# Patient Record
Sex: MALE | Race: BLACK OR AFRICAN AMERICAN | NOT HISPANIC OR LATINO | Employment: OTHER | URBAN - METROPOLITAN AREA
[De-identification: names, ages, dates, MRNs, and addresses within clinical notes are randomized per-mention and may not be internally consistent; named-entity substitution may affect disease eponyms.]

---

## 2018-01-01 ENCOUNTER — HOSPITAL ENCOUNTER (INPATIENT)
Facility: HOSPITAL | Age: 73
LOS: 2 days | Discharge: HOME HEALTH | DRG: 190 | End: 2018-01-03
Attending: EMERGENCY MEDICINE | Admitting: HOSPITALIST
Payer: MEDICARE

## 2018-01-01 DIAGNOSIS — J44.1 COPD WITH ACUTE EXACERBATION: Primary | ICD-10-CM

## 2018-01-01 DIAGNOSIS — R79.89 ELEVATED TROPONIN: ICD-10-CM

## 2018-01-01 DIAGNOSIS — R06.09 DYSPNEA ON EXERTION: ICD-10-CM

## 2018-01-01 DIAGNOSIS — R07.9 CHEST PAIN: ICD-10-CM

## 2018-01-01 PROBLEM — D72.829 LEUKOCYTOSIS: Status: ACTIVE | Noted: 2018-01-01

## 2018-01-01 LAB
ALBUMIN SERPL BCP-MCNC: 3.4 G/DL
ALP SERPL-CCNC: 105 U/L
ALT SERPL W/O P-5'-P-CCNC: 28 U/L
ANION GAP SERPL CALC-SCNC: 11 MMOL/L
AST SERPL-CCNC: 21 U/L
BASOPHILS # BLD AUTO: 0.08 K/UL
BASOPHILS NFR BLD: 0.6 %
BILIRUB SERPL-MCNC: 1.7 MG/DL
BNP SERPL-MCNC: 35 PG/ML
BUN SERPL-MCNC: 26 MG/DL
CALCIUM SERPL-MCNC: 9.7 MG/DL
CHLORIDE SERPL-SCNC: 109 MMOL/L
CO2 SERPL-SCNC: 32 MMOL/L
CREAT SERPL-MCNC: 1 MG/DL
DIFFERENTIAL METHOD: ABNORMAL
EOSINOPHIL # BLD AUTO: 0 K/UL
EOSINOPHIL NFR BLD: 0 %
ERYTHROCYTE [DISTWIDTH] IN BLOOD BY AUTOMATED COUNT: 12.7 %
EST. GFR  (AFRICAN AMERICAN): >60 ML/MIN/1.73 M^2
EST. GFR  (NON AFRICAN AMERICAN): >60 ML/MIN/1.73 M^2
FLUAV AG SPEC QL IA: NEGATIVE
FLUBV AG SPEC QL IA: NEGATIVE
GLUCOSE SERPL-MCNC: 133 MG/DL
HCT VFR BLD AUTO: 43.2 %
HGB BLD-MCNC: 14.2 G/DL
LYMPHOCYTES # BLD AUTO: 1.9 K/UL
LYMPHOCYTES NFR BLD: 13.8 %
MCH RBC QN AUTO: 31.6 PG
MCHC RBC AUTO-ENTMCNC: 32.9 G/DL
MCV RBC AUTO: 96 FL
MONOCYTES # BLD AUTO: 1 K/UL
MONOCYTES NFR BLD: 7 %
NEUTROPHILS # BLD AUTO: 10.9 K/UL
NEUTROPHILS NFR BLD: 78.6 %
PLATELET # BLD AUTO: 408 K/UL
PMV BLD AUTO: 9 FL
POTASSIUM SERPL-SCNC: 3.8 MMOL/L
PROT SERPL-MCNC: 7.4 G/DL
RBC # BLD AUTO: 4.49 M/UL
SODIUM SERPL-SCNC: 152 MMOL/L
SPECIMEN SOURCE: NORMAL
TROPONIN I SERPL DL<=0.01 NG/ML-MCNC: 0.03 NG/ML
TROPONIN I SERPL DL<=0.01 NG/ML-MCNC: 0.04 NG/ML
WBC # BLD AUTO: 13.9 K/UL

## 2018-01-01 PROCEDURE — 87400 INFLUENZA A/B EACH AG IA: CPT | Mod: 59

## 2018-01-01 PROCEDURE — 63600175 PHARM REV CODE 636 W HCPCS: Performed by: EMERGENCY MEDICINE

## 2018-01-01 PROCEDURE — 85025 COMPLETE CBC W/AUTO DIFF WBC: CPT

## 2018-01-01 PROCEDURE — 84484 ASSAY OF TROPONIN QUANT: CPT | Mod: 91

## 2018-01-01 PROCEDURE — 99900035 HC TECH TIME PER 15 MIN (STAT)

## 2018-01-01 PROCEDURE — 93005 ELECTROCARDIOGRAM TRACING: CPT

## 2018-01-01 PROCEDURE — 25000003 PHARM REV CODE 250: Performed by: EMERGENCY MEDICINE

## 2018-01-01 PROCEDURE — 36415 COLL VENOUS BLD VENIPUNCTURE: CPT

## 2018-01-01 PROCEDURE — 96365 THER/PROPH/DIAG IV INF INIT: CPT

## 2018-01-01 PROCEDURE — 94660 CPAP INITIATION&MGMT: CPT

## 2018-01-01 PROCEDURE — 83880 ASSAY OF NATRIURETIC PEPTIDE: CPT

## 2018-01-01 PROCEDURE — 27000221 HC OXYGEN, UP TO 24 HOURS

## 2018-01-01 PROCEDURE — 87040 BLOOD CULTURE FOR BACTERIA: CPT | Mod: 59

## 2018-01-01 PROCEDURE — 93010 ELECTROCARDIOGRAM REPORT: CPT | Mod: 76,,, | Performed by: INTERNAL MEDICINE

## 2018-01-01 PROCEDURE — 25000242 PHARM REV CODE 250 ALT 637 W/ HCPCS: Performed by: EMERGENCY MEDICINE

## 2018-01-01 PROCEDURE — 94640 AIRWAY INHALATION TREATMENT: CPT

## 2018-01-01 PROCEDURE — 63600175 PHARM REV CODE 636 W HCPCS: Performed by: HOSPITALIST

## 2018-01-01 PROCEDURE — 93010 ELECTROCARDIOGRAM REPORT: CPT | Mod: ,,, | Performed by: INTERNAL MEDICINE

## 2018-01-01 PROCEDURE — 27000190 HC CPAP FULL FACE MASK W/VALVE

## 2018-01-01 PROCEDURE — 96367 TX/PROPH/DG ADDL SEQ IV INF: CPT

## 2018-01-01 PROCEDURE — 80053 COMPREHEN METABOLIC PANEL: CPT

## 2018-01-01 PROCEDURE — 99285 EMERGENCY DEPT VISIT HI MDM: CPT | Mod: 25

## 2018-01-01 PROCEDURE — 94644 CONT INHLJ TX 1ST HOUR: CPT

## 2018-01-01 PROCEDURE — 27100107 HC POCKET PEAK FLOW METER

## 2018-01-01 PROCEDURE — 96366 THER/PROPH/DIAG IV INF ADDON: CPT

## 2018-01-01 PROCEDURE — 12000002 HC ACUTE/MED SURGE SEMI-PRIVATE ROOM

## 2018-01-01 PROCEDURE — 25000242 PHARM REV CODE 250 ALT 637 W/ HCPCS: Performed by: HOSPITALIST

## 2018-01-01 RX ORDER — SODIUM CHLORIDE 0.9 % (FLUSH) 0.9 %
3 SYRINGE (ML) INJECTION EVERY 8 HOURS PRN
Status: DISCONTINUED | OUTPATIENT
Start: 2018-01-01 | End: 2018-01-03 | Stop reason: HOSPADM

## 2018-01-01 RX ORDER — ONDANSETRON 2 MG/ML
4 INJECTION INTRAMUSCULAR; INTRAVENOUS EVERY 8 HOURS PRN
Status: DISCONTINUED | OUTPATIENT
Start: 2018-01-01 | End: 2018-01-03 | Stop reason: HOSPADM

## 2018-01-01 RX ORDER — IPRATROPIUM BROMIDE AND ALBUTEROL SULFATE 2.5; .5 MG/3ML; MG/3ML
3 SOLUTION RESPIRATORY (INHALATION) EVERY 4 HOURS
Status: DISCONTINUED | OUTPATIENT
Start: 2018-01-01 | End: 2018-01-01

## 2018-01-01 RX ORDER — IPRATROPIUM BROMIDE 0.5 MG/2.5ML
0.5 SOLUTION RESPIRATORY (INHALATION)
Status: COMPLETED | OUTPATIENT
Start: 2018-01-01 | End: 2018-01-01

## 2018-01-01 RX ORDER — METHYLPREDNISOLONE SOD SUCC 125 MG
125 VIAL (EA) INJECTION EVERY 8 HOURS
Status: DISCONTINUED | OUTPATIENT
Start: 2018-01-01 | End: 2018-01-03 | Stop reason: HOSPADM

## 2018-01-01 RX ORDER — IPRATROPIUM BROMIDE AND ALBUTEROL SULFATE 2.5; .5 MG/3ML; MG/3ML
3 SOLUTION RESPIRATORY (INHALATION) EVERY 4 HOURS
Status: DISCONTINUED | OUTPATIENT
Start: 2018-01-01 | End: 2018-01-03 | Stop reason: HOSPADM

## 2018-01-01 RX ORDER — CLOPIDOGREL BISULFATE 75 MG/1
75 TABLET ORAL DAILY
Status: DISCONTINUED | OUTPATIENT
Start: 2018-01-02 | End: 2018-01-03 | Stop reason: HOSPADM

## 2018-01-01 RX ORDER — ALBUTEROL SULFATE 2.5 MG/.5ML
10 SOLUTION RESPIRATORY (INHALATION)
Status: COMPLETED | OUTPATIENT
Start: 2018-01-01 | End: 2018-01-01

## 2018-01-01 RX ORDER — MAGNESIUM SULFATE HEPTAHYDRATE 40 MG/ML
2 INJECTION, SOLUTION INTRAVENOUS
Status: COMPLETED | OUTPATIENT
Start: 2018-01-01 | End: 2018-01-01

## 2018-01-01 RX ORDER — ASPIRIN 325 MG
325 TABLET ORAL DAILY
Status: DISCONTINUED | OUTPATIENT
Start: 2018-01-01 | End: 2018-01-03 | Stop reason: HOSPADM

## 2018-01-01 RX ORDER — TAMSULOSIN HYDROCHLORIDE 0.4 MG/1
0.4 CAPSULE ORAL DAILY
Status: DISCONTINUED | OUTPATIENT
Start: 2018-01-02 | End: 2018-01-03 | Stop reason: HOSPADM

## 2018-01-01 RX ORDER — NAPROXEN SODIUM 220 MG/1
324 TABLET, FILM COATED ORAL
Status: COMPLETED | OUTPATIENT
Start: 2018-01-01 | End: 2018-01-01

## 2018-01-01 RX ORDER — AMLODIPINE BESYLATE 5 MG/1
5 TABLET ORAL DAILY
Status: DISCONTINUED | OUTPATIENT
Start: 2018-01-02 | End: 2018-01-03 | Stop reason: HOSPADM

## 2018-01-01 RX ORDER — SODIUM CHLORIDE 9 MG/ML
INJECTION, SOLUTION INTRAVENOUS CONTINUOUS
Status: ACTIVE | OUTPATIENT
Start: 2018-01-01 | End: 2018-01-01

## 2018-01-01 RX ORDER — ATORVASTATIN CALCIUM 40 MG/1
80 TABLET, FILM COATED ORAL DAILY
Status: DISCONTINUED | OUTPATIENT
Start: 2018-01-02 | End: 2018-01-03 | Stop reason: HOSPADM

## 2018-01-01 RX ORDER — LEVETIRACETAM 500 MG/1
500 TABLET ORAL 2 TIMES DAILY
Status: DISCONTINUED | OUTPATIENT
Start: 2018-01-01 | End: 2018-01-03 | Stop reason: HOSPADM

## 2018-01-01 RX ORDER — FINASTERIDE 5 MG/1
5 TABLET, FILM COATED ORAL DAILY
Status: DISCONTINUED | OUTPATIENT
Start: 2018-01-02 | End: 2018-01-03 | Stop reason: HOSPADM

## 2018-01-01 RX ORDER — METHYLPREDNISOLONE SOD SUCC 125 MG
125 VIAL (EA) INJECTION EVERY 6 HOURS
Status: DISCONTINUED | OUTPATIENT
Start: 2018-01-01 | End: 2018-01-01

## 2018-01-01 RX ORDER — TIOTROPIUM BROMIDE 18 UG/1
1 CAPSULE ORAL; RESPIRATORY (INHALATION) DAILY
Status: DISCONTINUED | OUTPATIENT
Start: 2018-01-02 | End: 2018-01-03 | Stop reason: HOSPADM

## 2018-01-01 RX ORDER — LEVALBUTEROL 1.25 MG/.5ML
1.25 SOLUTION, CONCENTRATE RESPIRATORY (INHALATION)
Status: COMPLETED | OUTPATIENT
Start: 2018-01-01 | End: 2018-01-01

## 2018-01-01 RX ADMIN — LEVALBUTEROL 1.25 MG: 1.25 SOLUTION, CONCENTRATE RESPIRATORY (INHALATION) at 11:01

## 2018-01-01 RX ADMIN — ASPIRIN 81 MG 324 MG: 81 TABLET ORAL at 08:01

## 2018-01-01 RX ADMIN — AZITHROMYCIN MONOHYDRATE 500 MG: 500 INJECTION, POWDER, LYOPHILIZED, FOR SOLUTION INTRAVENOUS at 01:01

## 2018-01-01 RX ADMIN — IPRATROPIUM BROMIDE AND ALBUTEROL SULFATE 3 ML: .5; 3 SOLUTION RESPIRATORY (INHALATION) at 08:01

## 2018-01-01 RX ADMIN — ALBUTEROL SULFATE 10 MG: 2.5 SOLUTION RESPIRATORY (INHALATION) at 09:01

## 2018-01-01 RX ADMIN — LEVETIRACETAM 500 MG: 500 TABLET, FILM COATED ORAL at 09:01

## 2018-01-01 RX ADMIN — MAGNESIUM SULFATE IN WATER 2 G: 40 INJECTION, SOLUTION INTRAVENOUS at 09:01

## 2018-01-01 RX ADMIN — IPRATROPIUM BROMIDE 0.5 MG: 0.5 SOLUTION RESPIRATORY (INHALATION) at 11:01

## 2018-01-01 RX ADMIN — SODIUM CHLORIDE: 0.9 INJECTION, SOLUTION INTRAVENOUS at 05:01

## 2018-01-01 RX ADMIN — IPRATROPIUM BROMIDE 0.5 MG: 0.5 SOLUTION RESPIRATORY (INHALATION) at 09:01

## 2018-01-01 RX ADMIN — METHYLPREDNISOLONE SODIUM SUCCINATE 125 MG: 125 INJECTION, POWDER, FOR SOLUTION INTRAMUSCULAR; INTRAVENOUS at 09:01

## 2018-01-01 RX ADMIN — IPRATROPIUM BROMIDE AND ALBUTEROL SULFATE 3 ML: .5; 3 SOLUTION RESPIRATORY (INHALATION) at 11:01

## 2018-01-01 NOTE — HPI
73 y/o male with a PMHx of COPD presents to the ER via EMS for SOB that worsened today.  Patient was given breathing tx by EMS en route with some relief.  Shortness of breath is exacerbated with exertion.  Family reports patient is unable to walk a few feet without getting SOB.  Family member also reports of productive cough for the past few days.  Patient was evaluated for his cough by PCP who started him on an unknown medication but family member states the medication has not been working.  Patient is usually on 2L home O2.  Patient does not have a thermometer at home to check temperate.  Family member denies N/V/D and any other symptoms.

## 2018-01-01 NOTE — SUBJECTIVE & OBJECTIVE
Past Medical History:   Diagnosis Date    Alzheimer's dementia     COPD (chronic obstructive pulmonary disease)     Emphysema/COPD     Hypertension     Kidney stone     TIA (transient ischemic attack)        Past Surgical History:   Procedure Laterality Date    lithrotripsy         Review of patient's allergies indicates:  No Known Allergies    No current facility-administered medications on file prior to encounter.      Current Outpatient Prescriptions on File Prior to Encounter   Medication Sig    albuterol (PROVENTIL/VENTOLIN) 90 mcg/actuation inhaler Inhale 2 puffs into the lungs every 6 (six) hours as needed.    amlodipine (NORVASC) 5 MG tablet Take 5 mg by mouth once daily.    atorvastatin (LIPITOR) 80 MG tablet Take 80 mg by mouth once daily.    carvedilol (COREG) 12.5 MG tablet Take 12.5 mg by mouth 2 (two) times daily with meals.    clopidogrel (PLAVIX) 75 mg tablet Take 75 mg by mouth once daily.    ezetimibe (ZETIA) 10 mg tablet Take 10 mg by mouth once daily.    finasteride (PROSCAR) 5 mg tablet Take 5 mg by mouth once daily.    fluticasone-salmeterol 500-50 mcg/dose (ADVAIR DISKUS) 500-50 mcg/dose DsDv diskus inhaler Inhale 1 puff into the lungs 2 (two) times daily.    levetiracetam (KEPPRA) 500 MG Tab Take 500 mg by mouth 2 (two) times daily.    levofloxacin (LEVAQUIN) 500 MG tablet Take 500 mg by mouth once daily.    levothyroxine (SYNTHROID) 50 MCG tablet Take 50 mcg by mouth once daily.    POTASSIUM CITRATE ORAL Take by mouth.    predniSONE (DELTASONE) 10 MG tablet     PROAIR HFA 90 mcg/actuation inhaler     SPIRIVA WITH HANDIHALER 18 mcg inhalation capsule     TAMSULOSIN HCL (TAMSULOSIN ORAL) Take by mouth.    aclidinium bromide (TUDORZA PRESSAIR) 400 mcg/actuation AePB Inhale 1 puff into the lungs 2 (two) times daily.    amlodipine-atorvastatin (CADUET) 5-20 mg per tablet Take 1 tablet by mouth once daily.    benzonatate (TESSALON) 100 MG capsule Take 100 mg by mouth 3  (three) times daily as needed. for cough.    nitrofurantoin (MACRODANTIN) 100 MG capsule Take 100 mg by mouth 4 (four) times daily.    tamsulosin (FLOMAX) 0.4 mg Cp24 Take 1 capsule by mouth once daily.    [DISCONTINUED] donepezil (ARICEPT) 5 MG tablet Take 1 tablet (5 mg total) by mouth every evening.    [DISCONTINUED] hydrocodone-acetaminophen 5-325mg (NORCO) 5-325 mg per tablet Take 1 tablet by mouth every 4 (four) hours as needed for Pain.    [DISCONTINUED] predniSONE (DELTASONE) 20 MG tablet 3 tablets po daily x 3 days  2 tablets po daily x 3 days  1 tablet po daily x 3 days     Family History     Problem Relation (Age of Onset)    Diabetes Maternal Grandmother    Hypertension Brother        Social History Main Topics    Smoking status: Former Smoker     Packs/day: 1.00     Years: 6.00    Smokeless tobacco: Never Used    Alcohol use No    Drug use: No    Sexual activity: No     Review of Systems   Constitutional: Negative for chills and fever.   HENT: Negative for ear discharge and ear pain.    Eyes: Negative for pain and itching.   Respiratory: Positive for cough and shortness of breath.    Cardiovascular: Negative for chest pain and palpitations.   Gastrointestinal: Negative for abdominal distention and abdominal pain.   Endocrine: Negative for polyphagia and polyuria.   Genitourinary: Negative for difficulty urinating and dysuria.   Musculoskeletal: Negative for neck pain and neck stiffness.   Skin: Negative for rash and wound.   Neurological: Negative for seizures and syncope.   Psychiatric/Behavioral: Negative for agitation and hallucinations.     Objective:     Vital Signs (Most Recent):  Temp: 98.9 °F (37.2 °C) (01/01/18 0749)  Pulse: 110 (01/01/18 1401)  Resp: 20 (01/01/18 1120)  BP: (!) 156/80 (01/01/18 1401)  SpO2: 96 % (01/01/18 1401) Vital Signs (24h Range):  Temp:  [98.9 °F (37.2 °C)] 98.9 °F (37.2 °C)  Pulse:  [100-125] 110  Resp:  [18-28] 20  SpO2:  [95 %-100 %] 96 %  BP:  (138-199)/(66-82) 156/80     Weight: 54.4 kg (120 lb)  Body mass index is 19.37 kg/m².    Physical Exam   Constitutional: No distress.   HENT:   Head: Normocephalic and atraumatic.   Eyes: Conjunctivae are normal. Right eye exhibits no discharge. Left eye exhibits no discharge.   Neck: Neck supple.   Cardiovascular: Normal rate, regular rhythm and normal heart sounds.    Pulmonary/Chest: Effort normal. No stridor.   Bilateral expiratory wheezing   Abdominal: Soft. Bowel sounds are normal.   Musculoskeletal: He exhibits no deformity.   Neurological: He is alert. No cranial nerve deficit.   Skin: Skin is warm and dry. He is not diaphoretic.           Significant Labs:   BMP:   Recent Labs  Lab 01/01/18  0826   *   *   K 3.8      CO2 32*   BUN 26*   CREATININE 1.0   CALCIUM 9.7     CBC:   Recent Labs  Lab 01/01/18  0826   WBC 13.90*   HGB 14.2   HCT 43.2   *       Significant Imaging: I have reviewed all pertinent imaging results/findings within the past 24 hours.

## 2018-01-01 NOTE — ED TRIAGE NOTES
"Pt.'s family reports pt. Has a history of COPD and emphysema and has been experiencing increased SOB for the last week, pt. Is currently in no pain. Breathing is labored, pt. Has a non productive cough and family states that cough has also been increased over the last few weeks. Family also states "he has not eaten much in the last few days due to SOB." Pt. Is awake and alert, oriented to place and self but no time. Oxygen saturation is 88% on RA, pt. In mild distress, family at bedside.  "

## 2018-01-01 NOTE — H&P
[facility] Hospital Medicine  History & Physical    Patient Name: Phani Calhoun  MRN: [id number]  Admission Date: 1/1/2018  Attending Physician: Victor Hugo Walton MD   Primary Care Provider: Moni Peng MD         Patient information was obtained from ER records.     Subjective:     Principal Problem:COPD with acute exacerbation    Chief Complaint:   Chief Complaint   Patient presents with    Shortness of Breath     Pt reports ongoing shortness of breath with increasing severity today. Pt reports history of COPD.         HPI: 71 y/o male with a PMHx of COPD presents to the ER via EMS for SOB that worsened today.  Patient was given breathing tx by EMS en route with some relief.  Shortness of breath is exacerbated with exertion.  Family reports patient is unable to walk a few feet without getting SOB.  Family member also reports of productive cough for the past few days.  Patient was evaluated for his cough by PCP who started him on an unknown medication but family member states the medication has not been working.  Patient is usually on 2L home O2.  Patient does not have a thermometer at home to check temperate.  Family member denies N/V/D and any other symptoms.    Past Medical History:   Diagnosis Date    Alzheimer's dementia     COPD (chronic obstructive pulmonary disease)     Emphysema/COPD     Hypertension     Kidney stone     TIA (transient ischemic attack)        Past Surgical History:   Procedure Laterality Date    lithrotripsy         Review of patient's allergies indicates:  No Known Allergies    No current facility-administered medications on file prior to encounter.      Current Outpatient Prescriptions on File Prior to Encounter   Medication Sig    albuterol (PROVENTIL/VENTOLIN) 90 mcg/actuation inhaler Inhale 2 puffs into the lungs every 6 (six) hours as needed.    amlodipine (NORVASC) 5 MG tablet Take 5 mg by mouth once daily.    atorvastatin (LIPITOR) 80 MG tablet  Take 80 mg by mouth once daily.    carvedilol (COREG) 12.5 MG tablet Take 12.5 mg by mouth 2 (two) times daily with meals.    clopidogrel (PLAVIX) 75 mg tablet Take 75 mg by mouth once daily.    ezetimibe (ZETIA) 10 mg tablet Take 10 mg by mouth once daily.    finasteride (PROSCAR) 5 mg tablet Take 5 mg by mouth once daily.    fluticasone-salmeterol 500-50 mcg/dose (ADVAIR DISKUS) 500-50 mcg/dose DsDv diskus inhaler Inhale 1 puff into the lungs 2 (two) times daily.    levetiracetam (KEPPRA) 500 MG Tab Take 500 mg by mouth 2 (two) times daily.    levofloxacin (LEVAQUIN) 500 MG tablet Take 500 mg by mouth once daily.    levothyroxine (SYNTHROID) 50 MCG tablet Take 50 mcg by mouth once daily.    POTASSIUM CITRATE ORAL Take by mouth.    predniSONE (DELTASONE) 10 MG tablet     PROAIR HFA 90 mcg/actuation inhaler     SPIRIVA WITH HANDIHALER 18 mcg inhalation capsule     TAMSULOSIN HCL (TAMSULOSIN ORAL) Take by mouth.    aclidinium bromide (TUDORZA PRESSAIR) 400 mcg/actuation AePB Inhale 1 puff into the lungs 2 (two) times daily.    amlodipine-atorvastatin (CADUET) 5-20 mg per tablet Take 1 tablet by mouth once daily.    benzonatate (TESSALON) 100 MG capsule Take 100 mg by mouth 3 (three) times daily as needed. for cough.    nitrofurantoin (MACRODANTIN) 100 MG capsule Take 100 mg by mouth 4 (four) times daily.    tamsulosin (FLOMAX) 0.4 mg Cp24 Take 1 capsule by mouth once daily.    [DISCONTINUED] donepezil (ARICEPT) 5 MG tablet Take 1 tablet (5 mg total) by mouth every evening.    [DISCONTINUED] hydrocodone-acetaminophen 5-325mg (NORCO) 5-325 mg per tablet Take 1 tablet by mouth every 4 (four) hours as needed for Pain.    [DISCONTINUED] predniSONE (DELTASONE) 20 MG tablet 3 tablets po daily x 3 days  2 tablets po daily x 3 days  1 tablet po daily x 3 days     Family History     Problem Relation (Age of Onset)    Diabetes Maternal Grandmother    Hypertension Brother        Social History Main Topics     Smoking status: Former Smoker     Packs/day: 1.00     Years: 6.00    Smokeless tobacco: Never Used    Alcohol use No    Drug use: No    Sexual activity: No     Review of Systems   Constitutional: Negative for chills and fever.   HENT: Negative for ear discharge and ear pain.    Eyes: Negative for pain and itching.   Respiratory: Positive for cough and shortness of breath.    Cardiovascular: Negative for chest pain and palpitations.   Gastrointestinal: Negative for abdominal distention and abdominal pain.   Endocrine: Negative for polyphagia and polyuria.   Genitourinary: Negative for difficulty urinating and dysuria.   Musculoskeletal: Negative for neck pain and neck stiffness.   Skin: Negative for rash and wound.   Neurological: Negative for seizures and syncope.   Psychiatric/Behavioral: Negative for agitation and hallucinations.     Objective:     Vital Signs (Most Recent):  Temp: 98.9 °F (37.2 °C) (01/01/18 0749)  Pulse: 110 (01/01/18 1401)  Resp: 20 (01/01/18 1120)  BP: (!) 156/80 (01/01/18 1401)  SpO2: 96 % (01/01/18 1401) Vital Signs (24h Range):  Temp:  [98.9 °F (37.2 °C)] 98.9 °F (37.2 °C)  Pulse:  [100-125] 110  Resp:  [18-28] 20  SpO2:  [95 %-100 %] 96 %  BP: (138-199)/(66-82) 156/80     Weight: 54.4 kg (120 lb)  Body mass index is 19.37 kg/m².    Physical Exam   Constitutional: No distress.   HENT:   Head: Normocephalic and atraumatic.   Eyes: Conjunctivae are normal. Right eye exhibits no discharge. Left eye exhibits no discharge.   Neck: Neck supple.   Cardiovascular: Normal rate, regular rhythm and normal heart sounds.    Pulmonary/Chest: Effort normal. No stridor.   Bilateral expiratory wheezing   Abdominal: Soft. Bowel sounds are normal.   Musculoskeletal: He exhibits no deformity.   Neurological: He is alert. No cranial nerve deficit.   Skin: Skin is warm and dry. He is not diaphoretic.           Significant Labs:   BMP:   Recent Labs  Lab 01/01/18  0826   *   *   K 3.8   CL  109   CO2 32*   BUN 26*   CREATININE 1.0   CALCIUM 9.7     CBC:   Recent Labs  Lab 01/01/18  0826   WBC 13.90*   HGB 14.2   HCT 43.2   *       Significant Imaging: I have reviewed all pertinent imaging results/findings within the past 24 hours.    Assessment/Plan:     * COPD with acute exacerbation    Patient has been started on duonebs, oxygen, IV steroids and Zithromax.          Leukocytosis    Possibly secondary to recent steroid use.          Dementia              Hypernatremia              Hypertension    Continue home medications.            VTE Risk Mitigation     None             Jeremy Hill MD  Department of Hospital Medicine   [facility]

## 2018-01-01 NOTE — ED PROVIDER NOTES
Encounter Date: 1/1/2018    SCRIBE #1 NOTE: I, Millicent Javier, am scribing for, and in the presence of,  Victor Hugo Walton MD. I have scribed the following portions of the note - Other sections scribed: HPI/ROS.       History     Chief Complaint   Patient presents with    Shortness of Breath     Pt reports ongoing shortness of breath with increasing severity today. Pt reports history of COPD.      CC: Shortness of Breath    HPI: 72 y.o. M with a PMHx of COPD, emphysema, HTN, TIA, alzheimer's dementia, and kidney stone presents to the ED via EMS for SOB that worsened today. Pt was given breathing tx by EMS en route with some relief. SOB is exacerbated with exertion; family reports pt is unable to walk a few feet without getting SOB. Family member also reports of productive cough for the past few days. Pt was evaluated for his cough by PCP who started him on an unknown medication but family member states the medication has not been working. Pt is usually on 2L home O2. Pt does not have a thermometer at home to check temperate. Family member denies N/V/D and any other symptoms.       The history is provided by a relative.     Review of patient's allergies indicates:  No Known Allergies  Past Medical History:   Diagnosis Date    Alzheimer's dementia     COPD (chronic obstructive pulmonary disease)     Emphysema/COPD     Hypertension     Kidney stone     TIA (transient ischemic attack)      Past Surgical History:   Procedure Laterality Date    lithrotripsy       Family History   Problem Relation Age of Onset    Hypertension Brother     Diabetes Maternal Grandmother      Social History   Substance Use Topics    Smoking status: Former Smoker     Packs/day: 1.00     Years: 6.00    Smokeless tobacco: Never Used    Alcohol use No     Review of Systems   Constitutional: Negative for diaphoresis and fever.   HENT: Negative for ear pain and sore throat.    Eyes: Negative for pain and visual disturbance.   Respiratory:  Positive for cough and shortness of breath.    Cardiovascular: Negative for chest pain.   Gastrointestinal: Negative for abdominal pain, diarrhea, nausea and vomiting.   Genitourinary: Negative for dysuria.   Musculoskeletal: Negative for myalgias and neck pain.   Skin: Negative for rash and wound.   Neurological: Negative for headaches.       Physical Exam     Initial Vitals [18 0749]   BP Pulse Resp Temp SpO2   (!) 199/82 (!) 125 (!) 28 98.9 °F (37.2 °C) 98 %      MAP       121         Physical Exam  Nursing note and vitals reviewed.  Constitutional: Acutely ill male in obvious respiratory distress; arrived via EMS  HENT:    Head: NC/AT    Eyes: Conjunctivae normal.   (-) scleral icterus.              Mouth/Throat: Dry mucosal membranes.      Neck: Neck supple, normal rom. Negative stridor.  Cardiovascular: Tachycardic, regular rhythm  Pulmonary/Chest: Tachypneic - Decreased BS bilaterally w/ intercostal retractions and accessory muscle use.    Abdominal: Soft. ND/NT w/o guarding or rebound.  (+)BS.  (-) CVA tenderness.  Musculoskeletal: FROM of all major joints. No extremity edema or tenderness.  Neurological: A&Ox2,  Fragmented sentences secondary to respiratory distress - Otherwise, normal speech.  No acute focal motor deficits.    Skin: Skin is intact, warm and dry.       EKG Readings: (Independently Interpreted)   Initial Reading: No STEMI.   Sinus tachycardia, rate 122, normal intervals, nonspecific ST/T-wave abnormalities.    Imaging: (independent reading)  Chest x-ray -  normal appearing cardiac mediastinum.  Clear lung fields without obvious consolidation, pleural effusion or pneumothorax.      Differential diagnosis:   Initial differential diagnosis includes but is not limited to...URI, bronchitis, pneumonia, pneumothorax, asthma vs reactive airway disease, Pulmonary embolism, undiagnosed COPD vs CHF.     Additional Medical Decision Makin-year-old male with emphysema who presents the  emergency department via EMS for evaluation of progressively worsening shortness of breath and increased work of breathing - see history of present illness.  Patient received IV Solu-Medrol along with albuterol/Atrovent nebs in route.  Initial exam revealed an elderly male with increased respiratory effort - breath sounds diminished with faint bibasilar wheezing.  Continuous 1 hour albuterol/Atrovent nebs along with a magnesium sulfate started.     - Notable labs included elevated white count 13.90 with left shift, no bandemia.  Metabolic panel significant chronic hypernatremia, normal renal function and LFTs and a chronically elevated bicarb (32).  The EKG is without evidence of acute ischemia or dysrhythmia, however, his troponin is slightly elevated (0.027).   On repeat exam, he reports marked improvement with slight improvement regarding overall air movement.  Vital signs have slowly normalized.     findings at this time are most consistent with COPD exacerbation.  Given recent failed outpatient management and numerous comorbidities, patient will be admitted to medicine for further evaluation and management including scheduled albuterol/Atrovent nebs, IV steroids and serial cardiac enzymes.        ED Course   Critical Care  Date/Time: 1/1/2018 12:10 PM  Performed by: AKUA PUENTE.  Authorized by: AKUA PUENTE.   Direct patient critical care time: 20 minutes  Additional history critical care time: 10 minutes  Ordering / reviewing critical care time: 10 minutes  Documentation critical care time: 10 minutes  Consulting other physicians critical care time: 5 minutes  Total critical care time (exclusive of procedural time) : 55 minutes  Critical care time was exclusive of separately billable procedures and treating other patients and teaching time.  Critical care was necessary to treat or prevent imminent or life-threatening deterioration of the following conditions: respiratory failure.  Critical care  was time spent personally by me on the following activities: development of treatment plan with patient or surrogate, evaluation of patient's response to treatment, examination of patient, obtaining history from patient or surrogate, ordering and performing treatments and interventions, ordering and review of laboratory studies, ordering and review of radiographic studies, pulse oximetry, re-evaluation of patient's condition and review of old charts.        Labs Reviewed   CBC W/ AUTO DIFFERENTIAL - Abnormal; Notable for the following:        Result Value    WBC 13.90 (*)     RBC 4.49 (*)     MCH 31.6 (*)     Platelets 408 (*)     MPV 9.0 (*)     Gran # 10.9 (*)     Gran% 78.6 (*)     Lymph% 13.8 (*)     All other components within normal limits   COMPREHENSIVE METABOLIC PANEL - Abnormal; Notable for the following:     Sodium 152 (*)     CO2 32 (*)     Glucose 133 (*)     BUN, Bld 26 (*)     Albumin 3.4 (*)     Total Bilirubin 1.7 (*)     All other components within normal limits   TROPONIN I - Abnormal; Notable for the following:     Troponin I 0.027 (*)     All other components within normal limits   TROPONIN I - Abnormal; Notable for the following:     Troponin I 0.030 (*)     All other components within normal limits    Narrative:     Repeat Troponin  - Please redraw blood.   CULTURE, BLOOD   CULTURE, BLOOD   B-TYPE NATRIURETIC PEPTIDE                        Scribe Attestation:   Scribe #1: I performed the above scribed service and the documentation accurately describes the services I performed. I attest to the accuracy of the note.    Attending Attestation:           Physician Attestation for Scribe:  Physician Attestation Statement for Scribe #1: I, Victor Hugo Walton MD, reviewed documentation, as scribed by Millicent Javier in my presence, and it is both accurate and complete.                 ED Course      Clinical Impression:   The primary encounter diagnosis was COPD with acute exacerbation. Diagnoses of Chest  pain, Dyspnea on exertion, and Elevated troponin were also pertinent to this visit.                           Victor Hugo Walton MD  01/01/18 1141       Victor Hugo Walton MD  01/01/18 1316

## 2018-01-02 LAB
ALBUMIN SERPL BCP-MCNC: 2.8 G/DL
ALP SERPL-CCNC: 88 U/L
ALT SERPL W/O P-5'-P-CCNC: 23 U/L
ANION GAP SERPL CALC-SCNC: 7 MMOL/L
AST SERPL-CCNC: 18 U/L
BASOPHILS # BLD AUTO: 0.02 K/UL
BASOPHILS NFR BLD: 0.1 %
BILIRUB SERPL-MCNC: 1.1 MG/DL
BUN SERPL-MCNC: 23 MG/DL
CALCIUM SERPL-MCNC: 9 MG/DL
CHLORIDE SERPL-SCNC: 108 MMOL/L
CO2 SERPL-SCNC: 29 MMOL/L
CREAT SERPL-MCNC: 0.8 MG/DL
DIFFERENTIAL METHOD: ABNORMAL
EOSINOPHIL # BLD AUTO: 0 K/UL
EOSINOPHIL NFR BLD: 0 %
ERYTHROCYTE [DISTWIDTH] IN BLOOD BY AUTOMATED COUNT: 12.6 %
EST. GFR  (AFRICAN AMERICAN): >60 ML/MIN/1.73 M^2
EST. GFR  (NON AFRICAN AMERICAN): >60 ML/MIN/1.73 M^2
GLUCOSE SERPL-MCNC: 140 MG/DL
HCT VFR BLD AUTO: 36 %
HGB BLD-MCNC: 12 G/DL
LYMPHOCYTES # BLD AUTO: 0.7 K/UL
LYMPHOCYTES NFR BLD: 4.6 %
MAGNESIUM SERPL-MCNC: 2.5 MG/DL
MCH RBC QN AUTO: 31.7 PG
MCHC RBC AUTO-ENTMCNC: 33.3 G/DL
MCV RBC AUTO: 95 FL
MONOCYTES # BLD AUTO: 0.4 K/UL
MONOCYTES NFR BLD: 2.9 %
NEUTROPHILS # BLD AUTO: 13.2 K/UL
NEUTROPHILS NFR BLD: 92.4 %
PHOSPHATE SERPL-MCNC: 3.3 MG/DL
PLATELET # BLD AUTO: 371 K/UL
PMV BLD AUTO: 9.2 FL
POTASSIUM SERPL-SCNC: 4.2 MMOL/L
PROT SERPL-MCNC: 6.4 G/DL
RBC # BLD AUTO: 3.79 M/UL
SODIUM SERPL-SCNC: 144 MMOL/L
WBC # BLD AUTO: 14.32 K/UL

## 2018-01-02 PROCEDURE — 63600175 PHARM REV CODE 636 W HCPCS: Performed by: HOSPITALIST

## 2018-01-02 PROCEDURE — 25000242 PHARM REV CODE 250 ALT 637 W/ HCPCS: Performed by: EMERGENCY MEDICINE

## 2018-01-02 PROCEDURE — 94640 AIRWAY INHALATION TREATMENT: CPT

## 2018-01-02 PROCEDURE — 83735 ASSAY OF MAGNESIUM: CPT

## 2018-01-02 PROCEDURE — 80053 COMPREHEN METABOLIC PANEL: CPT

## 2018-01-02 PROCEDURE — 25000242 PHARM REV CODE 250 ALT 637 W/ HCPCS: Performed by: HOSPITALIST

## 2018-01-02 PROCEDURE — 36415 COLL VENOUS BLD VENIPUNCTURE: CPT

## 2018-01-02 PROCEDURE — 94660 CPAP INITIATION&MGMT: CPT

## 2018-01-02 PROCEDURE — 84100 ASSAY OF PHOSPHORUS: CPT

## 2018-01-02 PROCEDURE — 25000003 PHARM REV CODE 250: Performed by: EMERGENCY MEDICINE

## 2018-01-02 PROCEDURE — 85025 COMPLETE CBC W/AUTO DIFF WBC: CPT

## 2018-01-02 PROCEDURE — 99900035 HC TECH TIME PER 15 MIN (STAT)

## 2018-01-02 PROCEDURE — 12000002 HC ACUTE/MED SURGE SEMI-PRIVATE ROOM

## 2018-01-02 PROCEDURE — 63600175 PHARM REV CODE 636 W HCPCS: Performed by: EMERGENCY MEDICINE

## 2018-01-02 RX ORDER — OXYCODONE AND ACETAMINOPHEN 5; 325 MG/1; MG/1
1 TABLET ORAL EVERY 4 HOURS PRN
Status: DISCONTINUED | OUTPATIENT
Start: 2018-01-02 | End: 2018-01-03 | Stop reason: HOSPADM

## 2018-01-02 RX ORDER — ACETAMINOPHEN 325 MG/1
650 TABLET ORAL EVERY 4 HOURS PRN
Status: DISCONTINUED | OUTPATIENT
Start: 2018-01-02 | End: 2018-01-03 | Stop reason: HOSPADM

## 2018-01-02 RX ORDER — MORPHINE SULFATE 10 MG/ML
2 INJECTION INTRAMUSCULAR; INTRAVENOUS; SUBCUTANEOUS EVERY 4 HOURS PRN
Status: DISCONTINUED | OUTPATIENT
Start: 2018-01-02 | End: 2018-01-03 | Stop reason: HOSPADM

## 2018-01-02 RX ORDER — FLUTICASONE FUROATE AND VILANTEROL 100; 25 UG/1; UG/1
1 POWDER RESPIRATORY (INHALATION) DAILY
Status: DISCONTINUED | OUTPATIENT
Start: 2018-01-02 | End: 2018-01-03 | Stop reason: HOSPADM

## 2018-01-02 RX ADMIN — ASPIRIN 325 MG ORAL TABLET 325 MG: 325 PILL ORAL at 09:01

## 2018-01-02 RX ADMIN — IPRATROPIUM BROMIDE AND ALBUTEROL SULFATE 3 ML: .5; 3 SOLUTION RESPIRATORY (INHALATION) at 09:01

## 2018-01-02 RX ADMIN — IPRATROPIUM BROMIDE AND ALBUTEROL SULFATE 3 ML: .5; 3 SOLUTION RESPIRATORY (INHALATION) at 08:01

## 2018-01-02 RX ADMIN — LEVETIRACETAM 500 MG: 500 TABLET, FILM COATED ORAL at 09:01

## 2018-01-02 RX ADMIN — METHYLPREDNISOLONE SODIUM SUCCINATE 125 MG: 125 INJECTION, POWDER, FOR SOLUTION INTRAMUSCULAR; INTRAVENOUS at 05:01

## 2018-01-02 RX ADMIN — IPRATROPIUM BROMIDE AND ALBUTEROL SULFATE 3 ML: .5; 3 SOLUTION RESPIRATORY (INHALATION) at 12:01

## 2018-01-02 RX ADMIN — METHYLPREDNISOLONE SODIUM SUCCINATE 125 MG: 125 INJECTION, POWDER, FOR SOLUTION INTRAMUSCULAR; INTRAVENOUS at 03:01

## 2018-01-02 RX ADMIN — AMLODIPINE BESYLATE 5 MG: 5 TABLET ORAL at 09:01

## 2018-01-02 RX ADMIN — ATORVASTATIN CALCIUM 80 MG: 40 TABLET, FILM COATED ORAL at 09:01

## 2018-01-02 RX ADMIN — METHYLPREDNISOLONE SODIUM SUCCINATE 125 MG: 125 INJECTION, POWDER, FOR SOLUTION INTRAMUSCULAR; INTRAVENOUS at 09:01

## 2018-01-02 RX ADMIN — FINASTERIDE 5 MG: 5 TABLET, FILM COATED ORAL at 09:01

## 2018-01-02 RX ADMIN — IPRATROPIUM BROMIDE AND ALBUTEROL SULFATE 3 ML: .5; 3 SOLUTION RESPIRATORY (INHALATION) at 05:01

## 2018-01-02 RX ADMIN — AZITHROMYCIN MONOHYDRATE 500 MG: 500 INJECTION, POWDER, LYOPHILIZED, FOR SOLUTION INTRAVENOUS at 12:01

## 2018-01-02 RX ADMIN — TAMSULOSIN HYDROCHLORIDE 0.4 MG: 0.4 CAPSULE ORAL at 09:01

## 2018-01-02 RX ADMIN — IPRATROPIUM BROMIDE AND ALBUTEROL SULFATE 3 ML: .5; 3 SOLUTION RESPIRATORY (INHALATION) at 04:01

## 2018-01-02 RX ADMIN — CLOPIDOGREL BISULFATE 75 MG: 75 TABLET ORAL at 09:01

## 2018-01-02 RX ADMIN — TIOTROPIUM BROMIDE 18 MCG: 18 CAPSULE ORAL; RESPIRATORY (INHALATION) at 09:01

## 2018-01-02 NOTE — PROGRESS NOTES
Patient arrived awake and alert without complaints. Patient skin intact.no complaints of pain. Patient placed on tele  And on BiPAP by RESp

## 2018-01-03 VITALS
HEIGHT: 66 IN | RESPIRATION RATE: 16 BRPM | HEART RATE: 85 BPM | WEIGHT: 116.38 LBS | SYSTOLIC BLOOD PRESSURE: 144 MMHG | BODY MASS INDEX: 18.7 KG/M2 | OXYGEN SATURATION: 98 % | TEMPERATURE: 98 F | DIASTOLIC BLOOD PRESSURE: 73 MMHG

## 2018-01-03 PROBLEM — J44.1 COPD WITH ACUTE EXACERBATION: Status: RESOLVED | Noted: 2018-01-01 | Resolved: 2018-01-03

## 2018-01-03 PROBLEM — D72.829 LEUKOCYTOSIS: Status: RESOLVED | Noted: 2018-01-01 | Resolved: 2018-01-03

## 2018-01-03 LAB
BASOPHILS # BLD AUTO: 0.01 K/UL
BASOPHILS NFR BLD: 0.1 %
DIFFERENTIAL METHOD: ABNORMAL
EOSINOPHIL # BLD AUTO: 0 K/UL
EOSINOPHIL NFR BLD: 0 %
ERYTHROCYTE [DISTWIDTH] IN BLOOD BY AUTOMATED COUNT: 12.3 %
HCT VFR BLD AUTO: 34.2 %
HGB BLD-MCNC: 11.4 G/DL
LYMPHOCYTES # BLD AUTO: 0.8 K/UL
LYMPHOCYTES NFR BLD: 4.3 %
MCH RBC QN AUTO: 31.3 PG
MCHC RBC AUTO-ENTMCNC: 33.3 G/DL
MCV RBC AUTO: 94 FL
MONOCYTES # BLD AUTO: 0.7 K/UL
MONOCYTES NFR BLD: 3.4 %
NEUTROPHILS # BLD AUTO: 18 K/UL
NEUTROPHILS NFR BLD: 92.2 %
PLATELET # BLD AUTO: 495 K/UL
PMV BLD AUTO: 9.1 FL
RBC # BLD AUTO: 3.64 M/UL
WBC # BLD AUTO: 19.55 K/UL

## 2018-01-03 PROCEDURE — 94640 AIRWAY INHALATION TREATMENT: CPT

## 2018-01-03 PROCEDURE — 94660 CPAP INITIATION&MGMT: CPT

## 2018-01-03 PROCEDURE — 25000003 PHARM REV CODE 250: Performed by: EMERGENCY MEDICINE

## 2018-01-03 PROCEDURE — 99900035 HC TECH TIME PER 15 MIN (STAT)

## 2018-01-03 PROCEDURE — 25000242 PHARM REV CODE 250 ALT 637 W/ HCPCS: Performed by: HOSPITALIST

## 2018-01-03 PROCEDURE — 36415 COLL VENOUS BLD VENIPUNCTURE: CPT

## 2018-01-03 PROCEDURE — 85025 COMPLETE CBC W/AUTO DIFF WBC: CPT

## 2018-01-03 PROCEDURE — 27000221 HC OXYGEN, UP TO 24 HOURS

## 2018-01-03 PROCEDURE — 63600175 PHARM REV CODE 636 W HCPCS: Performed by: HOSPITALIST

## 2018-01-03 RX ORDER — ACETAMINOPHEN 325 MG/1
650 TABLET ORAL EVERY 4 HOURS PRN
Refills: 0 | COMMUNITY
Start: 2018-01-03 | End: 2022-01-11

## 2018-01-03 RX ORDER — BENZONATATE 100 MG/1
100 CAPSULE ORAL 3 TIMES DAILY PRN
Qty: 30 CAPSULE | Refills: 0 | Status: SHIPPED | OUTPATIENT
Start: 2018-01-03

## 2018-01-03 RX ORDER — LEVOFLOXACIN 500 MG/1
500 TABLET, FILM COATED ORAL DAILY
Qty: 5 TABLET | Refills: 0 | Status: SHIPPED | OUTPATIENT
Start: 2018-01-03 | End: 2018-01-08

## 2018-01-03 RX ORDER — IPRATROPIUM BROMIDE AND ALBUTEROL SULFATE 2.5; .5 MG/3ML; MG/3ML
3 SOLUTION RESPIRATORY (INHALATION) EVERY 4 HOURS PRN
Qty: 1 BOX | Refills: 11 | Status: SHIPPED | OUTPATIENT
Start: 2018-01-03 | End: 2022-01-11 | Stop reason: SDUPTHER

## 2018-01-03 RX ORDER — PREDNISONE 20 MG/1
TABLET ORAL
Qty: 20 TABLET | Refills: 0 | Status: SHIPPED | OUTPATIENT
Start: 2018-01-03 | End: 2022-01-11

## 2018-01-03 RX ADMIN — IPRATROPIUM BROMIDE AND ALBUTEROL SULFATE 3 ML: .5; 3 SOLUTION RESPIRATORY (INHALATION) at 04:01

## 2018-01-03 RX ADMIN — TAMSULOSIN HYDROCHLORIDE 0.4 MG: 0.4 CAPSULE ORAL at 08:01

## 2018-01-03 RX ADMIN — ATORVASTATIN CALCIUM 80 MG: 40 TABLET, FILM COATED ORAL at 08:01

## 2018-01-03 RX ADMIN — ASPIRIN 325 MG ORAL TABLET 325 MG: 325 PILL ORAL at 08:01

## 2018-01-03 RX ADMIN — IPRATROPIUM BROMIDE AND ALBUTEROL SULFATE 3 ML: .5; 3 SOLUTION RESPIRATORY (INHALATION) at 11:01

## 2018-01-03 RX ADMIN — METHYLPREDNISOLONE SODIUM SUCCINATE 125 MG: 125 INJECTION, POWDER, FOR SOLUTION INTRAMUSCULAR; INTRAVENOUS at 06:01

## 2018-01-03 RX ADMIN — IPRATROPIUM BROMIDE AND ALBUTEROL SULFATE 3 ML: .5; 3 SOLUTION RESPIRATORY (INHALATION) at 08:01

## 2018-01-03 RX ADMIN — FINASTERIDE 5 MG: 5 TABLET, FILM COATED ORAL at 08:01

## 2018-01-03 RX ADMIN — CLOPIDOGREL BISULFATE 75 MG: 75 TABLET ORAL at 08:01

## 2018-01-03 RX ADMIN — FLUTICASONE FUROATE AND VILANTEROL TRIFENATATE 1 PUFF: 100; 25 POWDER RESPIRATORY (INHALATION) at 08:01

## 2018-01-03 RX ADMIN — AMLODIPINE BESYLATE 5 MG: 5 TABLET ORAL at 08:01

## 2018-01-03 RX ADMIN — LEVETIRACETAM 500 MG: 500 TABLET, FILM COATED ORAL at 08:01

## 2018-01-03 RX ADMIN — TIOTROPIUM BROMIDE 18 MCG: 18 CAPSULE ORAL; RESPIRATORY (INHALATION) at 08:01

## 2018-01-03 RX ADMIN — IPRATROPIUM BROMIDE AND ALBUTEROL SULFATE 3 ML: .5; 3 SOLUTION RESPIRATORY (INHALATION) at 01:01

## 2018-01-03 NOTE — ASSESSMENT & PLAN NOTE
Duonebs, oxygen, IV steroids and Zithromax.  Will give one more day of treatment.  Hopefully home tomorrow.

## 2018-01-03 NOTE — DISCHARGE SUMMARY
[facility] Hospital Medicine  Discharge Summary      Patient Name: Phani Calhoun  MRN: [id number]  Admission Date: 1/1/2018  Hospital Length of Stay: 2 days  Discharge Date and Time:  01/03/2018 10:02 AM  Attending Physician: Gaudencio Hill MD   Discharging Provider: Gaudencio Hill MD  Primary Care Provider: Moni Peng MD      HPI:   71 y/o male with a PMHx of COPD presents to the ER via EMS for SOB that worsened today.  Patient was given breathing tx by EMS en route with some relief.  Shortness of breath is exacerbated with exertion.  Family reports patient is unable to walk a few feet without getting SOB.  Family member also reports of productive cough for the past few days.  Patient was evaluated for his cough by PCP who started him on an unknown medication but family member states the medication has not been working.  Patient is usually on 2L home O2.  Patient does not have a thermometer at home to check temperate.  Family member denies N/V/D and any other symptoms.    * No surgery found *      Hospital Course:   71 y/o male presented with shortness of breath.  Admitted with COPD exacerbation.  Started on nebs, oxygen, IV steroids and Azithromycin.  Patient already home oxygen dependent.  He initially presented with mild acute respiratory failure and required Bipap.  Bipap was able to be weaned off and currently doing well on NC.  Patient currently afebrile and hemodynamically stable.  Worsening leukocytosis secondary to steroids.  Patient seems to be at baseline.  He will be discharged home on slow steroid taper.  Patient does not have a nebulizer machine at home.  Will prescribe one.  Patient will follow up with PCP.     Consults:   Consults         Status Ordering Provider     IP consult to case management  Once     Provider:  (Not yet assigned)    Completed GAUDENCIO HILL          No new Assessment & Plan notes have been filed under this hospital service since the last note  "was generated.  Service: Hospital Medicine    Final Active Diagnoses:    Diagnosis Date Noted POA    Dementia [F03.90] 07/17/2014 Yes    Hypertension [I10] 08/30/2013 Yes      Problems Resolved During this Admission:    Diagnosis Date Noted Date Resolved POA    PRINCIPAL PROBLEM:  COPD with acute exacerbation [J44.1] 01/01/2018 01/03/2018 Yes    Leukocytosis [D72.829] 01/01/2018 01/03/2018 Yes    Hypernatremia [E87.0] 08/30/2013 01/03/2018 Yes       Discharged Condition: stable    Disposition: Home or Self Care    Follow Up:  Follow-up Information     Moni Peng MD In 1 week.    Specialty:  Internal Medicine  Contact information:  [address] [facility] [place] [address] [phone]                 Patient Instructions:     NEBULIZER FOR HOME USE   Order Specific Question Answer Comments   Height: 5' 6" (1.676 m)    Weight: 52.8 kg (116 lb 6.5 oz)    Does patient have medical equipment at home? oxygen Inhaler    Length of need (1-99 months): 99      Diet Cardiac (2gm sodium and 60gm fat)     Activity as tolerated     Notify your health care provider if you experience any of the following:  temperature >100.4     Notify your health care provider if you experience any of the following:  persistent nausea and vomiting or diarrhea     Notify your health care provider if you experience any of the following:  difficulty breathing or increased cough     Notify your health care provider if you experience any of the following:  persistent dizziness, light-headedness, or visual disturbances     Notify your health care provider if you experience any of the following:  increased confusion or weakness         Pending Diagnostic Studies:     None         Medications:  Reconciled Home Medications:   Current Discharge Medication List      START taking these medications    Details   acetaminophen (TYLENOL) 325 MG tablet Take 2 tablets (650 mg total) by mouth every 4 (four) hours as needed for Pain or Temperature " greater than (100).  Refills: 0      albuterol-ipratropium 2.5mg-0.5mg/3mL (DUO-NEB) 0.5 mg-3 mg(2.5 mg base)/3 mL nebulizer solution Take 3 mLs by nebulization every 4 (four) hours as needed for Wheezing or Shortness of Breath. Rescue  Qty: 1 Box, Refills: 11         CONTINUE these medications which have CHANGED    Details   albuterol (PROVENTIL/VENTOLIN) 90 mcg/actuation inhaler Inhale 2 puffs into the lungs every 6 (six) hours as needed for Wheezing or Shortness of Breath.  Qty: 1 each, Refills: 11      benzonatate (TESSALON) 100 MG capsule Take 1 capsule (100 mg total) by mouth 3 (three) times daily as needed. for cough.  Qty: 30 capsule, Refills: 0      levoFLOXacin (LEVAQUIN) 500 MG tablet Take 1 tablet (500 mg total) by mouth once daily.  Qty: 5 tablet, Refills: 0      predniSONE (DELTASONE) 20 MG tablet Take 3 tabs for 3 days, then 2 tabs for 3 days, then 1 tab for 3 days, then 1/2 tab for 4 days, then stop.  Qty: 20 tablet, Refills: 0         CONTINUE these medications which have NOT CHANGED    Details   amlodipine (NORVASC) 5 MG tablet Take 5 mg by mouth once daily.      atorvastatin (LIPITOR) 80 MG tablet Take 80 mg by mouth once daily.      carvedilol (COREG) 12.5 MG tablet Take 12.5 mg by mouth 2 (two) times daily with meals.      clopidogrel (PLAVIX) 75 mg tablet Take 75 mg by mouth once daily.      ezetimibe (ZETIA) 10 mg tablet Take 10 mg by mouth once daily.      finasteride (PROSCAR) 5 mg tablet Take 5 mg by mouth once daily.      fluticasone-salmeterol 500-50 mcg/dose (ADVAIR DISKUS) 500-50 mcg/dose DsDv diskus inhaler Inhale 1 puff into the lungs 2 (two) times daily.      levetiracetam (KEPPRA) 500 MG Tab Take 500 mg by mouth 2 (two) times daily.      levothyroxine (SYNTHROID) 50 MCG tablet Take 50 mcg by mouth once daily.      POTASSIUM CITRATE ORAL Take by mouth.      SPIRIVA WITH HANDIHALER 18 mcg inhalation capsule Refills: 11      TAMSULOSIN HCL (TAMSULOSIN ORAL) Take by mouth.       aclidinium bromide (TUDORZA PRESSAIR) 400 mcg/actuation AePB Inhale 1 puff into the lungs 2 (two) times daily.      amlodipine-atorvastatin (CADUET) 5-20 mg per tablet Take 1 tablet by mouth once daily.         STOP taking these medications       PROAIR HFA 90 mcg/actuation inhaler Comments:   Reason for Stopping:         donepezil (ARICEPT) 5 MG tablet Comments:   Reason for Stopping:         hydrocodone-acetaminophen 5-325mg (NORCO) 5-325 mg per tablet Comments:   Reason for Stopping:         nitrofurantoin (MACRODANTIN) 100 MG capsule Comments:   Reason for Stopping:               Indwelling Lines/Drains at time of discharge:   Lines/Drains/Airways          No matching active lines, drains, or airways          Time spent on the discharge of patient: >30 minutes  Patient was seen and examined on the date of discharge and determined to be suitable for discharge.         Jeremy Hill MD  Department of Hospital Medicine  [facility]

## 2018-01-03 NOTE — PROGRESS NOTES
THONY contacted [facility] @ [phone] to follow-up on nebulizer order, SW spoke to Tri that reported order was sent to [facility]. THONY contacted [facility] @ [phone] and spoke to Heather that confirmed receiving order and nebulizer will be shipped out by [facility] to patient home, will arrive tomorrow 1/4/18.

## 2018-01-03 NOTE — PLAN OF CARE
"THONY met with patient and brother to provide and view discharge follow-up instructions. EDUCATION: Patient and brother provided with educational information on COPD Flag Sheet.  Information reviewed and placed in "My Healthcare Packet" to be brought home for patient and brother to use as resource after discharge.  Information included:  signs and symptoms to look for and when to call the doctor if experiencing any symptoms that may indicate a medical emergency: CALL 911.  Reminded brother things they will be responsible for to manage patient healthcare at home: getting Rx filled, attending follow up appointments, and taking medication as prescribed were discussed. Teach back method used.  All questions answered.  Patient verbalized understanding of all information. THONY informed nurse Alejandrina  completed all discharge planning for patient and she could complete her nursing education/discharge when ready. THONY informed nurse Tinoco brother will go get patient portable oxygen so patient can transport home.         01/03/18 1251   Final Note   Assessment Type Final Discharge Note   Discharge Disposition Home-Health  ([facility])   Hospital Follow Up  Appt(s) scheduled? Yes   Discharge plans and expectations educations in teach back method with documentation complete? Yes   Right Care Referral Info   Post Acute Recommendation Home-care         "

## 2018-01-03 NOTE — SUBJECTIVE & OBJECTIVE
Interval History: Feeling better.    Review of Systems   HENT: Negative for ear discharge and ear pain.    Eyes: Negative for pain and redness.   Gastrointestinal: Negative for abdominal distention and abdominal pain.   Endocrine: Negative for polyphagia and polyuria.     Objective:     Vital Signs (Most Recent):  Temp: 96.9 °F (36.1 °C) (01/02/18 1553)  Pulse: 80 (01/02/18 1717)  Resp: 18 (01/02/18 1717)  BP: 130/61 (01/02/18 1553)  SpO2: 98 % (01/02/18 1717) Vital Signs (24h Range):  Temp:  [96 °F (35.6 °C)-97.7 °F (36.5 °C)] 96.9 °F (36.1 °C)  Pulse:  [72-91] 80  Resp:  [15-20] 18  SpO2:  [83 %-100 %] 98 %  BP: (130-168)/(61-89) 130/61     Weight: 52 kg (114 lb 10.2 oz)  Body mass index is 18.5 kg/m².    Intake/Output Summary (Last 24 hours) at 01/02/18 1811  Last data filed at 01/02/18 1708   Gross per 24 hour   Intake              490 ml   Output                0 ml   Net              490 ml      Physical Exam   Constitutional: No distress.   HENT:   Head: Normocephalic and atraumatic.   Eyes: Conjunctivae are normal. Right eye exhibits no discharge. Left eye exhibits no discharge.   Neck: Neck supple.   Cardiovascular: Normal rate, regular rhythm and normal heart sounds.    Pulmonary/Chest: Effort normal. No stridor.   Bilateral expiratory wheezing.  Improved   Abdominal: Soft. Bowel sounds are normal.   Musculoskeletal: He exhibits no deformity.   Neurological: He is alert. No cranial nerve deficit.   Skin: Skin is warm and dry. He is not diaphoretic.       Significant Labs:   BMP:   Recent Labs  Lab 01/02/18  0726   *      K 4.2      CO2 29   BUN 23   CREATININE 0.8   CALCIUM 9.0   MG 2.5     CBC:   Recent Labs  Lab 01/01/18  0826 01/02/18  0726   WBC 13.90* 14.32*   HGB 14.2 12.0*   HCT 43.2 36.0*   * 371*       Significant Imaging: I have reviewed all pertinent imaging results/findings within the past 24 hours.

## 2018-01-03 NOTE — PLAN OF CARE
Problem: Fall Risk (Adult)  Goal: Identify Related Risk Factors and Signs and Symptoms  Related risk factors and signs and symptoms are identified upon initiation of Human Response Clinical Practice Guideline (CPG)   Outcome: Ongoing (interventions implemented as appropriate)   18 05   Fall Risk   Related Risk Factors (Fall Risk) age-related changes;bladder function altered;fatigue/slow reaction;gait/mobility problems;environment unfamiliar   Signs and Symptoms (Fall Risk) presence of risk factors       Problem: Patient Care Overview  Goal: Plan of Care Review  Outcome: Ongoing (interventions implemented as appropriate)   18 05   Coping/Psychosocial   Plan Of Care Reviewed With patient       Problem: Pressure Ulcer Risk (Julius Scale) (Adult,Obstetrics,Pediatric)  Goal: Identify Related Risk Factors and Signs and Symptoms  Related risk factors and signs and symptoms are identified upon initiation of Human Response Clinical Practice Guideline (CPG)   Outcome: Ongoing (interventions implemented as appropriate)   18   Pressure Ulcer Risk (Julius Scale)   Related Risk Factors (Pressure Ulcer Risk (Julius Scale)) body weight extremes;age extremes;mechanical forces;medical devices;mobility impaired;nutritional deficiencies       Problem: Respiratory Distress Syndrome (,NICU)  Goal: Signs and Symptoms of Listed Potential Problems Will be Absent, Minimized or Managed (Respiratory Distress Syndrome)  Signs and symptoms of listed potential problems will be absent, minimized or managed by discharge/transition of care (reference Respiratory Distress Syndrome ([place],NICU) CPG).  Outcome: Ongoing (interventions implemented as appropriate)   18 05   Respiratory Distress Syndrome   Problems Assessed (Respiratory Distress Syndrome) all   Problems Present (Respiratory Distress Syndrome) hypoxia/hypoxemia   Plan of care reviewed with patient and brother. Bipap in use at 10-5 with 35% 02.  Sats 94-98%. Fall precautions maintained with bed alarm engaged.Turns self frequently. Skin intact.

## 2018-01-03 NOTE — PROGRESS NOTES
[facility] Hospital Medicine  Progress Note    Patient Name: Phani Calhoun  MRN: [id number]  Patient Class: IP- Inpatient   Admission Date: 1/1/2018  Length of Stay: 1 days  Attending Physician: Jeremy Hill MD  Primary Care Provider: Moni Peng MD        Subjective:     Principal Problem:COPD with acute exacerbation    HPI:  71 y/o male with a PMHx of COPD presents to the ER via EMS for SOB that worsened today.  Patient was given breathing tx by EMS en route with some relief.  Shortness of breath is exacerbated with exertion.  Family reports patient is unable to walk a few feet without getting SOB.  Family member also reports of productive cough for the past few days.  Patient was evaluated for his cough by PCP who started him on an unknown medication but family member states the medication has not been working.  Patient is usually on 2L home O2.  Patient does not have a thermometer at home to check temperate.  Family member denies N/V/D and any other symptoms.    Hospital Course:  71 y/o male presented with shortness of breath.  Admitted with COPD exacerbation.  Started on nebs, oxygen, IV steroids and Azithromycin.    Interval History: Feeling better.    Review of Systems   HENT: Negative for ear discharge and ear pain.    Eyes: Negative for pain and redness.   Gastrointestinal: Negative for abdominal distention and abdominal pain.   Endocrine: Negative for polyphagia and polyuria.     Objective:     Vital Signs (Most Recent):  Temp: 96.9 °F (36.1 °C) (01/02/18 1553)  Pulse: 80 (01/02/18 1717)  Resp: 18 (01/02/18 1717)  BP: 130/61 (01/02/18 1553)  SpO2: 98 % (01/02/18 1717) Vital Signs (24h Range):  Temp:  [96 °F (35.6 °C)-97.7 °F (36.5 °C)] 96.9 °F (36.1 °C)  Pulse:  [72-91] 80  Resp:  [15-20] 18  SpO2:  [83 %-100 %] 98 %  BP: (130-168)/(61-89) 130/61     Weight: 52 kg (114 lb 10.2 oz)  Body mass index is 18.5 kg/m².    Intake/Output Summary (Last 24 hours) at 01/02/18 1811  Last  data filed at 01/02/18 1708   Gross per 24 hour   Intake              490 ml   Output                0 ml   Net              490 ml      Physical Exam   Constitutional: No distress.   HENT:   Head: Normocephalic and atraumatic.   Eyes: Conjunctivae are normal. Right eye exhibits no discharge. Left eye exhibits no discharge.   Neck: Neck supple.   Cardiovascular: Normal rate, regular rhythm and normal heart sounds.    Pulmonary/Chest: Effort normal. No stridor.   Bilateral expiratory wheezing.  Improved   Abdominal: Soft. Bowel sounds are normal.   Musculoskeletal: He exhibits no deformity.   Neurological: He is alert. No cranial nerve deficit.   Skin: Skin is warm and dry. He is not diaphoretic.       Significant Labs:   BMP:   Recent Labs  Lab 01/02/18  0726   *      K 4.2      CO2 29   BUN 23   CREATININE 0.8   CALCIUM 9.0   MG 2.5     CBC:   Recent Labs  Lab 01/01/18  0826 01/02/18  0726   WBC 13.90* 14.32*   HGB 14.2 12.0*   HCT 43.2 36.0*   * 371*       Significant Imaging: I have reviewed all pertinent imaging results/findings within the past 24 hours.    Assessment/Plan:      * COPD with acute exacerbation    Duonebs, oxygen, IV steroids and Zithromax.  Will give one more day of treatment.  Hopefully home tomorrow.          Leukocytosis    Possibly secondary to recent steroid use.          Dementia              Hypernatremia    Resolved.          Hypertension    Continue home medications.            VTE Risk Mitigation         Ordered     Medium Risk of VTE  Once      01/01/18 1738     Place GABRIELE hose  Until discontinued      01/01/18 1738     Place sequential compression device  Until discontinued      01/01/18 1738              Jeremy Hill MD  Department of Hospital Medicine   [facility]

## 2018-01-03 NOTE — PROGRESS NOTES
AAOX3  Denies any needs and no distress noted.  Respirations even and unlabored.  VSS.  IV and tele dc'd.  Dc and Rx intructions reviewed with pt and his brother.  Verbalized understanding.  Waiting for family to bring home O2 to Dc pt home.

## 2018-01-03 NOTE — PLAN OF CARE
[facility] HEALTH ORDERS  FACE TO FACE ENCOUNTER    Patient Name: Phani Calhoun  YOB: 1945    PCP: Moni Peng MD   PCP Address: [address] [place] [address] [place] [address] [place] [address]  PCP Phone Number: [phone]  PCP Fax: [phone]       Encounter Date: 01/03/2018    Admit to Home Health    Diagnoses:  Active Hospital Problems    Diagnosis  POA    Dementia [F03.90]  Yes    Hypertension [I10]  Yes      Resolved Hospital Problems    Diagnosis Date Resolved POA    *COPD with acute exacerbation [J44.1] 01/03/2018 Yes     Priority: 1 - High    Leukocytosis [D72.829] 01/03/2018 Yes    Hypernatremia [E87.0] 01/03/2018 Yes       No future appointments.  Follow-up Information     Moni Peng MD In 1 week.    Specialty:  Internal Medicine  Contact information:  [address] [address] [place] [id number] [phone]                     I have seen and examined this patient face to face today. My clinical findings that support the need for the home health skilled services and home bound status are the following:  Weakness/numbness causing balance and gait disturbance due to COPD Exacerbation making it taxing to leave home.    Allergies:Review of patient's allergies indicates:  No Known Allergies    Diet: cardiac diet    Activities: activity as tolerated    Nursing:   SN to complete comprehensive assessment including routine vital signs. Instruct on disease process and s/s of complications to report to MD. Review/verify medication list sent home with the patient at time of discharge  and instruct patient/caregiver as needed. Frequency may be adjusted depending on start of care date.    Notify MD if SBP > 160 or < 90; DBP > 90 or < 50; HR > 120 or < 50; Temp > 101      CONSULTS:    Physical Therapy to evaluate and treat. Evaluate for home safety and equipment needs; Establish/upgrade home exercise program. Perform / instruct on therapeutic exercises, gait  training, transfer training, and Range of Motion.  Occupational Therapy to evaluate and treat. Evaluate home environment for safety and equipment needs. Perform/Instruct on transfers, ADL training, ROM, and therapeutic exercises.      Medications: Review discharge medications with patient and family and provide education.      Current Discharge Medication List      START taking these medications    Details   acetaminophen (TYLENOL) 325 MG tablet Take 2 tablets (650 mg total) by mouth every 4 (four) hours as needed for Pain or Temperature greater than (100).  Refills: 0      albuterol-ipratropium 2.5mg-0.5mg/3mL (DUO-NEB) 0.5 mg-3 mg(2.5 mg base)/3 mL nebulizer solution Take 3 mLs by nebulization every 4 (four) hours as needed for Wheezing or Shortness of Breath. Rescue  Qty: 1 Box, Refills: 11         CONTINUE these medications which have CHANGED    Details   albuterol (PROVENTIL/VENTOLIN) 90 mcg/actuation inhaler Inhale 2 puffs into the lungs every 6 (six) hours as needed for Wheezing or Shortness of Breath.  Qty: 1 each, Refills: 11      benzonatate (TESSALON) 100 MG capsule Take 1 capsule (100 mg total) by mouth 3 (three) times daily as needed. for cough.  Qty: 30 capsule, Refills: 0      levoFLOXacin (LEVAQUIN) 500 MG tablet Take 1 tablet (500 mg total) by mouth once daily.  Qty: 5 tablet, Refills: 0      predniSONE (DELTASONE) 20 MG tablet Take 3 tabs for 3 days, then 2 tabs for 3 days, then 1 tab for 3 days, then 1/2 tab for 4 days, then stop.  Qty: 20 tablet, Refills: 0         CONTINUE these medications which have NOT CHANGED    Details   amlodipine (NORVASC) 5 MG tablet Take 5 mg by mouth once daily.      atorvastatin (LIPITOR) 80 MG tablet Take 80 mg by mouth once daily.      carvedilol (COREG) 12.5 MG tablet Take 12.5 mg by mouth 2 (two) times daily with meals.      clopidogrel (PLAVIX) 75 mg tablet Take 75 mg by mouth once daily.      ezetimibe (ZETIA) 10 mg tablet Take 10 mg by mouth once daily.       finasteride (PROSCAR) 5 mg tablet Take 5 mg by mouth once daily.      fluticasone-salmeterol 500-50 mcg/dose (ADVAIR DISKUS) 500-50 mcg/dose DsDv diskus inhaler Inhale 1 puff into the lungs 2 (two) times daily.      levetiracetam (KEPPRA) 500 MG Tab Take 500 mg by mouth 2 (two) times daily.      levothyroxine (SYNTHROID) 50 MCG tablet Take 50 mcg by mouth once daily.      POTASSIUM CITRATE ORAL Take by mouth.      SPIRIVA WITH HANDIHALER 18 mcg inhalation capsule Refills: 11      TAMSULOSIN HCL (TAMSULOSIN ORAL) Take by mouth.      aclidinium bromide (TUDORZA PRESSAIR) 400 mcg/actuation AePB Inhale 1 puff into the lungs 2 (two) times daily.      amlodipine-atorvastatin (CADUET) 5-20 mg per tablet Take 1 tablet by mouth once daily.         STOP taking these medications       PROAIR HFA 90 mcg/actuation inhaler Comments:   Reason for Stopping:         donepezil (ARICEPT) 5 MG tablet Comments:   Reason for Stopping:         hydrocodone-acetaminophen 5-325mg (NORCO) 5-325 mg per tablet Comments:   Reason for Stopping:         nitrofurantoin (MACRODANTIN) 100 MG capsule Comments:   Reason for Stopping:               I certify that this patient is confined to his home and needs intermittent skilled nursing care, physical therapy and occupational therapy.

## 2018-01-03 NOTE — PROGRESS NOTES
Follow-up Information     Moni Peng MD. Go on 1/10/2018.    Specialty:  Internal Medicine  Why:  Outpatient Services, PCP Follow-up Appointment, Please arrive to clinic for 9:30AM  Contact information:  [address] [place]  [place] [address]  [phone]             [facility].    Specialty:  Home Health Services  Why:  Home Health Provider  Contact information:  [address] [facility]  [place] [address]  [phone]             [facility].    Specialty:  DME Provider  Why:  DME Provider (Nebulizer)  Contact information:  [address] [facility]  [place] [address] [phone]                   [facility]    WRITTEN HEALTHCARE AND DISCHARGE INFORMATION                            Help at Home           [phone]  After discharge for assistance [facility] On Call Nurse Care Line 24/7  Assistance    Things You are responsible For To Manage Your Care At Home:  1.    Getting your prescriptions filled   2.    Taking your medications as directed, DO NOT MISS ANY DOSES!  3.    Going to your follow-up doctor appointment. This is important because it  allow the doctor to monitor your progress and determine if  any changes need to made to your treatment plan.     Thank you for choosing [facility] for your care.  Please answer any calls you may receive from [facility] we want to continue to support you as you manage your healthcare needs. [facility] is happy to have the opportunity to serve you.     Sincerely,  Your [facility] Team,  Karina Brewer LMSW   II  [phone]

## 2018-01-03 NOTE — PROGRESS NOTES
SW spoke to patient's brother Júnior to discuss discharge plan, brother requests for [facility]. THONY referred patient to Stefany Regan (Transition Care Coordinator) with [facility].

## 2018-01-03 NOTE — HOSPITAL COURSE
73 y/o male presented with shortness of breath.  Admitted with COPD exacerbation.  Started on nebs, oxygen, IV steroids and Azithromycin.  Patient already home oxygen dependent.  He initially presented with mild acute respiratory failure and required Bipap.  Bipap was able to be weaned off and currently doing well on NC.  Patient currently afebrile and hemodynamically stable.  Worsening leukocytosis secondary to steroids.  Patient seems to be at baseline.  He will be discharged home on slow steroid taper.  Patient does not have a nebulizer machine at home.  Will prescribe one.  Patient will follow up with PCP.

## 2018-01-05 ENCOUNTER — PATIENT OUTREACH (OUTPATIENT)
Dept: ADMINISTRATIVE | Facility: CLINIC | Age: 73
End: 2018-01-05

## 2018-01-05 NOTE — PATIENT INSTRUCTIONS
COPD Flare  Both emphysema and chronic bronchitis are forms of Chronic Obstructive Pulmonary Disease (COPD). It is most often caused by many years of smoking tobacco. Many things can make your lung disease suddenly get worse. These causes include the common cold, pneumonia, acute bronchitis, missing doses of your regular breathing medicines, or exposure to smoke, dust, or other air pollutants.  A COPD flare may last 7-14 days. Medicine may be prescribed to relax the airways and prevent wheezing. Antibiotics will be prescribed if your doctor thinks there is a bacterial infection. Prednisone is helpful to decrease inflammation in a severe attack.  Home Care:  Drink lots of water or other fluids (at least 10 glasses a day) during an attack. This will loosen lung secretions and make it easier to breathe. If you have heart or kidney disease, check with your doctor before you drink extra amounts of fluids.  Take prescribed medicine exactly at the times advised. If you have a hand-held inhaler or aerosol breathing medicine, do not use it more than once every four hours, unless told to do so. If prescribed an antibiotic or prednisone, take all of the medicine even if you are feeling better after a few days.  Do not smoke. Avoid being exposed to the smoke of others.  If you were given an inhaler, use it exactly as directed. If you need to use it more often than prescribed, your condition may be getting worse. Contact your doctor or this facility.  Follow Up  with your doctor, or as advised by our staff.  NOTE: If you are age 65 or older, or if you have chronic asthma or COPD, we  recommend a PNEUMOCOCCAL VACCINATION every five years and an INFLUENZA VACCINATION (FLU-SHOT) every autumn. Ask your doctor about this.  Get Prompt Medical Attention  if any of the following occur:  Increased wheezing or shortness of breath  Need to use your inhalers more often than usual without relief  Fever of 100.4°F (38ºC) or higher, or as  directed by your healthcare provider  Coughing up lots of dark-colored or bloody sputum (mucus)  Chest pain with each breath  You do not start to improve within 24 hours  © 6341-9087 [employer], [address] [place] [address], [place] [address]. All rights reserved. This information is not intended as a substitute for professional medical care. Always follow your healthcare professional's instructions.

## 2018-01-05 NOTE — PROGRESS NOTES
C3 nurse contacted [facility] as pt reporting he did not receive tessalon pearls for cough. Pharmacist reporting med was picked up on 1/4/18.    Contacted pt to inform of above; Pt will stop in at [facility].

## 2018-01-06 LAB
BACTERIA BLD CULT: NORMAL
BACTERIA BLD CULT: NORMAL

## 2018-01-16 NOTE — PHYSICIAN QUERY
PT Name: Phani Calhoun  MR #: [id number]     Physician Query Form - Documentation Clarification      CDS/: Tara Arenas               Contact information:[email or web address]      This form is a permanent document in the medical record.     Query Date: January 16, 2018    By submitting this query, we are merely seeking further clarification of documentation. Please utilize your independent clinical judgment when addressing the question(s) below.    The Medical record reflects the following:    Supporting Clinical Findings Location in Medical Record   73 y/o male presented with shortness of breath.  Admitted with COPD exacerbation.  Started on nebs, oxygen, IV steroids and Azithromycin.  Patient already home oxygen dependent.  He initially presented with mild acute respiratory failure and required Bipap.  Bipap was able to be weaned off and currently doing well on NC.  Patient currently afebrile and hemodynamically stable.  Worsening leukocytosis secondary to steroids.  Patient seems to be at baseline.  He will be discharged home on slow steroid taper.  Patient does not have a nebulizer machine at home.  Will prescribe one.  Patient will follow up with PCP.     Patient is usually on 2L home O2  COPD with acute exacerbation      Discharge Summary                                                                           Doctor, Please specify diagnosis or diagnoses associated with above clinical findings.    Provider Use Only      [    x  ] Acute on chronic respiratory failure   [      ] Acute respiratory failure   [      ] Chronic respiratory failure   [      ] Other:_______________                                                                                                             [  ] Clinically undetermined

## 2018-04-10 ENCOUNTER — APPOINTMENT (OUTPATIENT)
Dept: LAB | Facility: HOSPITAL | Age: 73
End: 2018-04-10
Attending: UROLOGY
Payer: MEDICARE

## 2018-04-10 DIAGNOSIS — G30.9 ALZHEIMER'S DISEASE: ICD-10-CM

## 2018-04-10 DIAGNOSIS — F02.80 ALZHEIMER'S DISEASE: ICD-10-CM

## 2018-04-10 LAB
BACTERIA #/AREA URNS HPF: ABNORMAL /HPF
BILIRUB UR QL STRIP: NEGATIVE
CLARITY UR: ABNORMAL
COLOR UR: YELLOW
GLUCOSE UR QL STRIP: NEGATIVE
HGB UR QL STRIP: ABNORMAL
HYALINE CASTS #/AREA URNS LPF: 0 /LPF
KETONES UR QL STRIP: NEGATIVE
LEUKOCYTE ESTERASE UR QL STRIP: ABNORMAL
MICROSCOPIC COMMENT: ABNORMAL
NITRITE UR QL STRIP: POSITIVE
PH UR STRIP: 6 [PH] (ref 5–8)
PROT UR QL STRIP: ABNORMAL
RBC #/AREA URNS HPF: >100 /HPF (ref 0–4)
SP GR UR STRIP: 1.02 (ref 1–1.03)
SQUAMOUS #/AREA URNS HPF: 5 /HPF
URN SPEC COLLECT METH UR: ABNORMAL
UROBILINOGEN UR STRIP-ACNC: NEGATIVE EU/DL
WBC #/AREA URNS HPF: 35 /HPF (ref 0–5)

## 2018-04-10 PROCEDURE — 87186 SC STD MICRODIL/AGAR DIL: CPT

## 2018-04-10 PROCEDURE — 87088 URINE BACTERIA CULTURE: CPT

## 2018-04-10 PROCEDURE — 81000 URINALYSIS NONAUTO W/SCOPE: CPT

## 2018-04-10 PROCEDURE — 87086 URINE CULTURE/COLONY COUNT: CPT

## 2018-04-10 PROCEDURE — 87077 CULTURE AEROBIC IDENTIFY: CPT

## 2018-04-13 LAB — BACTERIA UR CULT: NORMAL

## 2020-11-23 NOTE — PLAN OF CARE
"SW met with patient and brother Júnior at bedside to complete discharge needs assessment. SW provided and reviewed "Patient Blue Health Packet", "Discharge Planning Begins on Admission" brochure and "Help At Home" handout. THONY discussed with brother the things patient will be responsible for to manage health at home. Patient and brother has no preference for doctor appointment times.      [id number] - [place], [place] - [address]  [address] [facility] [address]  Phone: [phone] Fax: [phone]           01/03/18 7439   Discharge Assessment   Assessment Type Discharge Planning Assessment   Confirmed/corrected address and phone number on facesheet? Yes   Assessment information obtained from? Patient;Other  (Júnior (brother))   Prior to hospitilization cognitive status: Alert/Oriented;No Deficits   Prior to hospitalization functional status: Needs Assistance   Current cognitive status: Alert/Oriented;No Deficits   Current Functional Status: Needs Assistance   Facility Arrived From: Home   Lives With sibling(s)   Able to Return to Prior Arrangements yes   Is patient able to care for self after discharge? Unable to determine at this time (comments)   Who are your caregiver(s) and their phone number(s)? Júnior (brother) [phone], Marianna (sister-in-law) [phone]   Patient's perception of discharge disposition home or selfcare;home health   Readmission Within The Last 30 Days no previous admission in last 30 days   Patient currently being followed by outpatient case management? No   Patient currently receives any other outside agency services? No   Equipment Currently Used at Home oxygen  (Inhaler )   Do you have any problems affording any of your prescribed medications? No   Is the patient taking medications as prescribed? yes   Does the patient have transportation home? Yes   Transportation Available family or friend will provide   Does the patient receive " Received pt handoff report from YADIRA Guzmán in OR. Pt now back in room 304. AAOx4 and VS stable on room air. Pt R knee dressing clean, dry and intact with no signs of bleeding. Bed low and locked with side rails up x 2 and call light within reach. Pt denies any needs at this time. Will continue to monitor.   services at [facility]? No   Discharge Plan A Home with family;Home Health   Discharge Plan B Home with family  (PCP F/U)   Patient/Family In Agreement With Plan yes   Does the patient have transportation to healthcare appointments? Yes

## 2021-07-27 ENCOUNTER — OFFICE VISIT (OUTPATIENT)
Dept: ORTHOPEDICS | Facility: CLINIC | Age: 76
End: 2021-07-27
Payer: MEDICARE

## 2021-07-27 VITALS
HEART RATE: 69 BPM | BODY MASS INDEX: 23.83 KG/M2 | WEIGHT: 134.5 LBS | RESPIRATION RATE: 16 BRPM | HEIGHT: 63 IN | DIASTOLIC BLOOD PRESSURE: 75 MMHG | SYSTOLIC BLOOD PRESSURE: 154 MMHG

## 2021-07-27 DIAGNOSIS — M70.22 OLECRANON BURSITIS, LEFT ELBOW: Primary | ICD-10-CM

## 2021-07-27 PROCEDURE — 99999 PR PBB SHADOW E&M-NEW PATIENT-LVL V: ICD-10-PCS | Mod: PBBFAC,,, | Performed by: ORTHOPAEDIC SURGERY

## 2021-07-27 PROCEDURE — 99205 OFFICE O/P NEW HI 60 MIN: CPT | Mod: PBBFAC,PN | Performed by: ORTHOPAEDIC SURGERY

## 2021-07-27 PROCEDURE — 99203 PR OFFICE/OUTPT VISIT, NEW, LEVL III, 30-44 MIN: ICD-10-PCS | Mod: S$PBB,,, | Performed by: ORTHOPAEDIC SURGERY

## 2021-07-27 PROCEDURE — 99203 OFFICE O/P NEW LOW 30 MIN: CPT | Mod: S$PBB,,, | Performed by: ORTHOPAEDIC SURGERY

## 2021-07-27 PROCEDURE — 99999 PR PBB SHADOW E&M-NEW PATIENT-LVL V: CPT | Mod: PBBFAC,,, | Performed by: ORTHOPAEDIC SURGERY

## 2021-07-27 RX ORDER — LEVOTHYROXINE SODIUM 75 UG/1
75 TABLET ORAL DAILY
COMMUNITY
Start: 2021-04-27

## 2021-07-27 RX ORDER — ALBUTEROL SULFATE 90 UG/1
2 AEROSOL, METERED RESPIRATORY (INHALATION) EVERY 4 HOURS PRN
COMMUNITY
Start: 2021-06-29 | End: 2022-03-15 | Stop reason: SDUPTHER

## 2021-07-27 RX ORDER — TAMSULOSIN HYDROCHLORIDE 0.4 MG/1
2 CAPSULE ORAL DAILY
COMMUNITY
Start: 2021-05-10

## 2021-07-27 RX ORDER — AZITHROMYCIN 250 MG/1
TABLET, FILM COATED ORAL
Status: ON HOLD | COMMUNITY
Start: 2021-05-10 | End: 2022-01-14 | Stop reason: HOSPADM

## 2021-07-27 RX ORDER — PREDNISONE 10 MG/1
TABLET ORAL
COMMUNITY
Start: 2021-05-10 | End: 2022-01-11

## 2021-07-27 RX ORDER — FLUTICASONE FUROATE, UMECLIDINIUM BROMIDE AND VILANTEROL TRIFENATATE 100; 62.5; 25 UG/1; UG/1; UG/1
1 POWDER RESPIRATORY (INHALATION) DAILY
COMMUNITY
Start: 2021-04-27 | End: 2022-03-09 | Stop reason: ALTCHOICE

## 2022-01-12 PROBLEM — J96.21 ACUTE ON CHRONIC RESPIRATORY FAILURE WITH HYPOXIA AND HYPERCAPNIA: Status: ACTIVE | Noted: 2022-01-12

## 2022-01-12 PROBLEM — J96.22 ACUTE ON CHRONIC RESPIRATORY FAILURE WITH HYPOXIA AND HYPERCAPNIA: Status: ACTIVE | Noted: 2022-01-12

## 2022-03-09 ENCOUNTER — OFFICE VISIT (OUTPATIENT)
Dept: PULMONOLOGY | Facility: CLINIC | Age: 77
End: 2022-03-09
Payer: MEDICARE

## 2022-03-09 VITALS
DIASTOLIC BLOOD PRESSURE: 69 MMHG | WEIGHT: 116.31 LBS | HEART RATE: 87 BPM | SYSTOLIC BLOOD PRESSURE: 116 MMHG | BODY MASS INDEX: 18.69 KG/M2 | OXYGEN SATURATION: 94 % | HEIGHT: 66 IN

## 2022-03-09 DIAGNOSIS — J44.9 CHRONIC OBSTRUCTIVE PULMONARY DISEASE, UNSPECIFIED COPD TYPE: Primary | ICD-10-CM

## 2022-03-09 DIAGNOSIS — J96.12 CHRONIC RESPIRATORY FAILURE WITH HYPOXIA AND HYPERCAPNIA: ICD-10-CM

## 2022-03-09 DIAGNOSIS — J96.11 CHRONIC RESPIRATORY FAILURE WITH HYPOXIA AND HYPERCAPNIA: ICD-10-CM

## 2022-03-09 PROCEDURE — 99999 PR PBB SHADOW E&M-EST. PATIENT-LVL IV: CPT | Mod: PBBFAC,,, | Performed by: NURSE PRACTITIONER

## 2022-03-09 PROCEDURE — 99214 OFFICE O/P EST MOD 30 MIN: CPT | Mod: PBBFAC,PN | Performed by: NURSE PRACTITIONER

## 2022-03-09 PROCEDURE — 99999 PR PBB SHADOW E&M-EST. PATIENT-LVL IV: ICD-10-PCS | Mod: PBBFAC,,, | Performed by: NURSE PRACTITIONER

## 2022-03-09 PROCEDURE — 99204 PR OFFICE/OUTPT VISIT, NEW, LEVL IV, 45-59 MIN: ICD-10-PCS | Mod: S$PBB,,, | Performed by: NURSE PRACTITIONER

## 2022-03-09 PROCEDURE — 99204 OFFICE O/P NEW MOD 45 MIN: CPT | Mod: S$PBB,,, | Performed by: NURSE PRACTITIONER

## 2022-03-09 RX ORDER — PREDNISONE 5 MG/1
5 TABLET ORAL DAILY
COMMUNITY
Start: 2022-02-15

## 2022-03-09 RX ORDER — BUDESONIDE, GLYCOPYRROLATE, AND FORMOTEROL FUMARATE 160; 9; 4.8 UG/1; UG/1; UG/1
2 AEROSOL, METERED RESPIRATORY (INHALATION) 2 TIMES DAILY
Qty: 32.1 G | Refills: 3 | Status: SHIPPED | OUTPATIENT
Start: 2022-03-09

## 2022-03-09 NOTE — PROGRESS NOTES
Subjective:       Patient ID: Tone Calhoun is a 76 y.o. male.    Chief Complaint: Hospital follow up    HPI     Mr. Calhoun is a 77 y/o M with PMH of Hx of Alzheimer's Dementia, COPD, Emphysema, HTN, Kidney stone, and TIA  presenting to pulmonary clinic for hospital follow up.   Pat has several ER and hospitalization for COPD exacerbation. Last exacerbation was 2/20/2022.  Accompanied with brother.   Brother reports he is the caregiver and patient lives with him.  Brother provided most of patient's history.   He describes patient is on Trelegy inhaler, but seems to have difficulty using device secondary to dementia.  Patient is on oxygen at 2 L per nasal cannula-benefits from use.  It is believed the patient has approximately 20 pack year smoking history and quit a about 15 years ago.  Today, patient feels he does not have an increase in shortness of breath, cough or sputum production.      Reviewed allergies and medications.  Reviewed medical and surgical history.  Reviewed social and family history.    Review of Systems   Constitutional: Negative for fever, chills, weight loss and night sweats.   HENT: Negative for postnasal drip, rhinorrhea, trouble swallowing and congestion.    Respiratory: Positive for cough, shortness of breath and dyspnea on extertion. Negative for hemoptysis, sputum production, choking, chest tightness, wheezing and use of rescue inhaler.    Cardiovascular: Negative for chest pain and leg swelling.   Musculoskeletal: Positive for gait problem. Negative for joint swelling.   Skin: Negative for rash.   Gastrointestinal: Negative for acid reflux.   Neurological: Negative for dizziness and light-headedness.   Hematological: Negative for adenopathy.   Psychiatric/Behavioral: Positive for confusion. The patient is not nervous/anxious.          Objective:      Vitals:    03/09/22 1408   BP: 116/69   BP Location: Right arm   Patient Position: Sitting   BP Method: Medium (Automatic)   Pulse: 87  "  SpO2: (!) 94%  Comment: room air   Weight: 52.7 kg (116 lb 4.7 oz)   Height: 5' 6" (1.676 m)      Physical Exam   Constitutional: He is oriented to person, place, and time. He appears well-developed and well-nourished. No distress.   HENT:   Head: Normocephalic.   Cardiovascular: Normal rate, regular rhythm and normal heart sounds.   Pulmonary/Chest: Normal expansion and effort normal. No respiratory distress. He has decreased breath sounds. He has no wheezes. He has no rhonchi.   On oxygen    Abdominal: Soft. Bowel sounds are normal.   Musculoskeletal:         General: No edema. Normal range of motion.      Cervical back: Normal range of motion and neck supple.   Lymphadenopathy: No supraclavicular adenopathy is present.     He has no cervical adenopathy.   Neurological: He is alert and oriented to person, place, and time. Gait abnormal.   In W/C   Skin: Skin is warm and dry. Nails show no clubbing.   Psychiatric: Cognition and memory are impaired.   Vitals reviewed.    Personal Diagnostic Review     NO PFTS on File.     X-Ray Chest 1 View  Narrative: EXAMINATION:  XR CHEST 1 VIEW    CLINICAL HISTORY:  Shortness of breath    TECHNIQUE:  Single frontal view of the chest was performed.    COMPARISON:  January 11, 2022    FINDINGS:  The patient has advanced emphysematous changes as evidenced by hyperinflation of the lung parenchyma and decreased attenuation of the lung tissue.  There does not appear to be any significant changes compared to the previous examination performed on January 11, 2022.    The heart is normal in size and contour.  There is calcification of the arch of the aorta.  No indication of free air under the diaphragm.  Impression: Emphysematous changes of the lung parenchyma which appear to be stable.    No indication of active pneumonia or acute pulmonary process.    Electronically signed by: Sivakumar Rowe  Date:    02/02/2022  Time:    12:03         Assessment:       1. Chronic obstructive " pulmonary disease, unspecified COPD type    2. Chronic respiratory failure with hypoxia and hypercapnia        Outpatient Encounter Medications as of 3/9/2022   Medication Sig Dispense Refill    aclidinium bromide (TUDORZA) 400 mcg/actuation AePB Inhale 1 puff into the lungs 2 (two) times daily.      albuterol (PROVENTIL/VENTOLIN) 90 mcg/actuation inhaler Inhale 2 puffs into the lungs every 6 (six) hours as needed for Wheezing or Shortness of Breath. 1 each 11    albuterol-ipratropium (DUO-NEB) 2.5 mg-0.5 mg/3 mL nebulizer solution Take 3 mLs by nebulization every 4 (four) hours as needed for Wheezing or Shortness of Breath. Rescue 1 each 11    amlodipine (NORVASC) 5 MG tablet Take 5 mg by mouth once daily.      atorvastatin (LIPITOR) 80 MG tablet Take 80 mg by mouth once daily.      benzonatate (TESSALON) 100 MG capsule Take 1 capsule (100 mg total) by mouth 3 (three) times daily as needed. for cough. 30 capsule 0    carvedilol (COREG) 12.5 MG tablet Take 12.5 mg by mouth 2 (two) times daily with meals.      clopidogrel (PLAVIX) 75 mg tablet Take 75 mg by mouth once daily.      ezetimibe (ZETIA) 10 mg tablet Take 10 mg by mouth once daily.      finasteride (PROSCAR) 5 mg tablet Take 5 mg by mouth once daily.      levetiracetam (KEPPRA) 500 MG Tab Take 500 mg by mouth 2 (two) times daily.      POTASSIUM CITRATE ORAL Take by mouth.      predniSONE (DELTASONE) 5 MG tablet Take 5 mg by mouth once daily.      tamsulosin (FLOMAX) 0.4 mg Cap Take 2 capsules by mouth once daily.      [DISCONTINUED] fluticasone-salmeterol diskus inhaler 500-50 mcg Inhale 1 puff into the lungs 2 (two) times daily.      [DISCONTINUED] SPIRIVA WITH HANDIHALER 18 mcg inhalation capsule   11    [DISCONTINUED] TRELEGY ELLIPTA 100-62.5-25 mcg DsDv Take 1 puff by mouth once daily.      albuterol (PROVENTIL/VENTOLIN HFA) 90 mcg/actuation inhaler Inhale 2 puffs into the lungs every 4 (four) hours as needed.       budesonide-glycopyr-formoterol (BREZTRI AEROSPHERE) 160-9-4.8 mcg/actuation HFAA Inhale 2 puffs into the lungs 2 (two) times a day. Rinse mouth after use. 32.1 g 3    ibuprofen (ADVIL,MOTRIN) 800 MG tablet Take 1 tablet (800 mg total) by mouth every 6 (six) hours as needed for Pain. (Patient not taking: No sig reported) 20 tablet 0    levothyroxine (SYNTHROID) 50 MCG tablet Take 50 mcg by mouth once daily.      levothyroxine (SYNTHROID) 75 MCG tablet Take 75 mcg by mouth once daily.       No facility-administered encounter medications on file as of 3/9/2022.     No orders of the defined types were placed in this encounter.      Plan:         Problem List Items Addressed This Visit        Pulmonary    Chronic respiratory failure with hypoxia and hypercapnia    Current Assessment & Plan     Home Oxygen    Face to face visit with pt regarding their home oxygen.  We have discussed the need for oxygen including continuous use, prn use or night time use (as appropriate for the pt).  We discussed the need for compliance with the therapy. We will do recertifications as necessary.              Chronic obstructive pulmonary disease - Primary    Current Assessment & Plan     Patient with approximately 20 pack year smoking history and quit about 15 years ago.  Has had several COPD exacerbations in the last several months.    Mr. Calhoun has dementia and is of poor functional status-discussed with both patient and brother on further testing including pulmonary function tests and CT of chest.  However, they agree secondary to patient's condition-  he would be unable to obtain pulmonary function test has difficulty following commands.  Additionally, they argue against CT of chest following because of his poor functional status and likely would not tolerate surgery, chemotherapy or radiation if needed.    Plan:  -seems as patient has difficulty with trelegy inhaler device.  Concerns patient is not using device correctly.   -will  start Breztri.  Spacer provided.  Demonstrated on proper use.  Discussed the rinse mouth after use  -continue with albuterol nebs as needed for rescue  -continue with oxygen as prescribed  -did offer pulmonary rehab-however, they will think about this.  -I did offer patient resources for outpatient palliative care.  They will think about it and will follow-up with her primary care provider for further assistance.               Relevant Medications    budesonide-glycopyr-formoterol (BREZTRI AEROSPHERE) 160-9-4.8 mcg/actuation HFAA        Follow-up is needed    48 minutes of total time spent on the encounter, which includes face to face time and non-face to face time preparing to see the patient (eg, review of tests), Obtaining and/or reviewing separately obtained history, Documenting clinical information in the electronic or other health record, Independently interpreting results (not separately reported) and communicating results to the patient/family/caregiver, or Care coordination (not separately reported).      This note is dictated on M*Modal word recognition program.  There are word recognition mistakes that are occasionally missed on review.

## 2022-03-09 NOTE — PATIENT INSTRUCTIONS
Stop Trelegy  Start Breztri.   Provided spacer.   Continue oxygen as prescribed.   Continue albuterol as rescue inhaler.   Follow up with PCP  Obtain pulse ox to monitor oxygen saturation. Oxygen saturation should range 88-94

## 2022-03-09 NOTE — ASSESSMENT & PLAN NOTE
Patient with approximately 20 pack year smoking history and quit about 15 years ago.  Has had several COPD exacerbations in the last several months.    Mr. Calhoun has dementia and is of poor functional status-discussed with both patient and brother on further testing including pulmonary function tests and CT of chest.  However, they agree secondary to patient's condition-  he would be unable to obtain pulmonary function test has difficulty following commands.  Additionally, they argue against CT of chest following because of his poor functional status and likely would not tolerate surgery, chemotherapy or radiation if needed.    Plan:  -seems as patient has difficulty with trelegy inhaler device.  Concerns patient is not using device correctly.   -will start Breztri.  Spacer provided.  Demonstrated on proper use.  Discussed the rinse mouth after use  -continue with albuterol nebs as needed for rescue  -continue with oxygen as prescribed  -did offer pulmonary rehab-however, they will think about this.  -I did offer patient resources for outpatient palliative care.  They will think about it and will follow-up with her primary care provider for further assistance.

## 2024-02-21 NOTE — ED NOTES
notified about blood cultures.  
Bed rails are up and call light is within patient reach.  
Bed: 09main  Expected date:   Expected time:   Means of arrival:   Comments:  EMS  
Care hand off given to BRANDON Balbuena  
Debra in lab notified regarding blood work  
Patient placed on continuous cardiac monitor, automatic blood pressure cuff and continuous pulse oximeter.  
RT notified about bipap and nebulizer tx.  
RT notified that patient is being sent to floor on non rebreather and needs to be put back on bipap. Sahra tele monitor notified notified that patient is on tele box [id number].  
Report received from BRANDON Celaya.  
no